# Patient Record
Sex: MALE | Race: WHITE | Employment: UNEMPLOYED | URBAN - METROPOLITAN AREA
[De-identification: names, ages, dates, MRNs, and addresses within clinical notes are randomized per-mention and may not be internally consistent; named-entity substitution may affect disease eponyms.]

---

## 2017-01-04 ENCOUNTER — TELEPHONE (OUTPATIENT)
Dept: GASTROENTEROLOGY | Facility: CLINIC | Age: 67
End: 2017-01-04

## 2017-01-04 DIAGNOSIS — Z12.11 ENCOUNTER FOR SCREENING COLONOSCOPY: Primary | ICD-10-CM

## 2017-01-12 ENCOUNTER — SURGERY (OUTPATIENT)
Age: 67
End: 2017-01-12

## 2017-01-12 RX ADMIN — FENTANYL CITRATE 50 MCG: 50 INJECTION, SOLUTION INTRAMUSCULAR; INTRAVENOUS at 10:56

## 2017-01-12 RX ADMIN — MIDAZOLAM HYDROCHLORIDE 1 MG: 1 INJECTION, SOLUTION INTRAMUSCULAR; INTRAVENOUS at 11:06

## 2017-01-12 RX ADMIN — FENTANYL CITRATE 50 MCG: 50 INJECTION, SOLUTION INTRAMUSCULAR; INTRAVENOUS at 11:04

## 2017-01-12 RX ADMIN — MIDAZOLAM HYDROCHLORIDE 1 MG: 1 INJECTION, SOLUTION INTRAMUSCULAR; INTRAVENOUS at 10:56

## 2017-01-12 RX ADMIN — Medication 2 ML: at 10:50

## 2017-01-18 ENCOUNTER — CARE COORDINATION (OUTPATIENT)
Dept: TRANSPLANT | Facility: CLINIC | Age: 67
End: 2017-01-18

## 2017-04-05 ENCOUNTER — PRE VISIT (OUTPATIENT)
Dept: UROLOGY | Facility: CLINIC | Age: 67
End: 2017-04-05

## 2017-04-13 ENCOUNTER — OFFICE VISIT (OUTPATIENT)
Dept: UROLOGY | Facility: CLINIC | Age: 67
End: 2017-04-13

## 2017-04-13 VITALS
BODY MASS INDEX: 23.1 KG/M2 | SYSTOLIC BLOOD PRESSURE: 143 MMHG | WEIGHT: 165 LBS | HEIGHT: 71 IN | HEART RATE: 82 BPM | DIASTOLIC BLOOD PRESSURE: 85 MMHG

## 2017-04-13 DIAGNOSIS — C67.8 MALIGNANT NEOPLASM OF OVERLAPPING SITES OF BLADDER (H): Primary | ICD-10-CM

## 2017-04-13 PROCEDURE — 88112 CYTOPATH CELL ENHANCE TECH: CPT | Performed by: UROLOGY

## 2017-04-13 PROCEDURE — 00000103 ZZHCL STATISTIC CYTO-FISH QC 88120: Performed by: UROLOGY

## 2017-04-13 ASSESSMENT — PAIN SCALES - GENERAL: PAINLEVEL: NO PAIN (0)

## 2017-04-13 NOTE — MR AVS SNAPSHOT
After Visit Summary   4/13/2017    Lupillo Murguia    MRN: [id number]           Patient Information     Date Of Birth          1950        Visit Information        Provider Department      4/13/2017 9:30 AM Ignacio Bustillos MD [facility] Urology and [facility]rolog[facility]        Today's Diagnoses     Malignant neoplasm of overlapping sites of bladder (H)    -  1      Care Instructions    Will send urine for cytology/FISH test.  If negative, follow up with Dr. Bustillos in one year with cystoscopy.    It was a pleasure meeting with you today.  Thank you for allowing me and my team the privilege of caring for you today.  YOU are the reason we are here, and I truly hope we provided you with the excellent service you deserve.  Please let us know if there is anything else we can do for you so that we can be sure you are leaving completely satisfied with your care experience.      Christi Jones KAMLA        Follow-ups after your visit        Your next 10 appointments already scheduled     Sep 26, 2017 10:00 AM CDT   (Arrive by 9:45 AM)   Return Liver Transplant with Le Simmons MD   [facility] Hepatology ([facility])    [address] [place] [id number] [phone]              Who to contact     Please call your clinic at [phone] to:    Ask questions about your health    Make or cancel appointments    Discuss your medicines    Learn about your test results    Speak to your doctor   If you have compliments or concerns about an experience at your clinic, or if you wish to file a complaint, please contact [facility] Patient Relations at [phone] or email us at [email or web address]         Additional Information About Your Visit        [employer] Information     [employer] is an electronic gateway that provides easy, online access to your medical records. With [employer] you can  "request a clinic appointment, read your test results, renew a prescription or communicate with your care team.     To sign up for [employer] visit the website at [email or web address]   You will be asked to enter the access code listed below, as well as some personal information. Please follow the directions to create your username and password.     Your access code is: [email or web address]  Expires: 2017  6:30 AM     Your access code will  in 90 days. If you need help or a new code, please contact your [facility] or call [phone] for assistance.        Care EveryWhere ID     This is your Care EveryWhere ID. This could be used by other organizations to access your [facility] medical records  [id number]        Your Vitals Were     Pulse Height BMI (Body Mass Index)             82 1.803 m (5' 11\") 23.01 kg/m2          Blood Pressure from Last 3 Encounters:   17 143/85   17 129/77   16 154/84    Weight from Last 3 Encounters:   17 74.8 kg (165 lb)   16 75 kg (165 lb 6.4 oz)   16 76.1 kg (167 lb 11.2 oz)              We Performed the Following     Cystoscopy ([address])     Cytology non gyn        Primary Care Provider Office Phone # Fax #    Joaquin Haines [phone]       [facility] [address]  [address] [facility] [address]        Thank you!     Thank you for choosing [facility] UROLOGY AND [facility] FOR PROSTATE AND UROLOGIC CANCERS  for your care. Our goal is always to provide you with excellent care. Hearing back from our patients is one way we can continue to improve our services. Please take a few minutes to complete the written survey that you may receive in the mail after your visit with us. Thank you!             Your Updated Medication List - Protect others around you: Learn how to safely use, store and throw away your medicines at www.disposemymeds.org.          This list is accurate as of: 17  9:50 AM.  " Always use your most recent med list.                   Brand Name Dispense Instructions for use    amLODIPine 5 MG tablet    NORVASC     Take 5 mg by mouth daily.       ASPIRIN PO      Take 325 mg by mouth daily       fluticasone 110 MCG/ACT Inhaler    FLOVENT HFA     Inhale 2 puffs into the lungs 2 times daily       metoprolol 25 MG tablet    LOPRESSOR     Take 25 mg by mouth 2 times daily. 1/2 tablet       OXYCODONE HCL PO      Take 5 mg by mouth every 6 hours as needed       tacrolimus 0.5 MG capsule    PROGRAF - GENERIC EQUIVALENT    180 capsule    Take 3 capsules (1.5 mg) by mouth every 12 hours       ursodiol 300 MG capsule    ACTIGALL    60 capsule    TAKE ONE CAPSULE BY MOUTH TWICE A DAY       VENTOLIN  (90 BASE) MCG/ACT Inhaler   Generic drug:  albuterol      Inhale 2 puffs into the lungs every 6 hours.

## 2017-04-13 NOTE — NURSING NOTE
Invasive Procedure Safety Checklist:    Procedure: Cystoscopy    Action: Complete sections and checkboxes as appropriate.    Pre-procedure:  1. Patient ID Verified with 2 identifiers (Selina and  or MRN) : YES    2. Procedure and site verified with patient/designee (when able) : YES    3. Accurate consent documentation in medical record : NO    4. H&P (or appropriate assessment) documented in medical record : NO  H&P must be up to 30 days prior to procedure an updated within 24 hours of                 Procedure as applicable.     5. Relevant diagnostic and radiology test results appropriately labeled and displayed as applicable : NO    6. Blood products, implants, devices, and/or special equipment available for the procedure as applicable : NO    7. Procedure site(s) marked with provider initials [Exclusions: ] : NO    8. Marking not required. Reason : Yes  Procedure does not require site marking    Time Out:     Time-Out performed immediately prior to starting procedure, including verbal and active participation of all team members addressing: YES    1. Correct patient identity.  2. Confirmed that the correct side and site are marked.  3. An accurate procedure to be done.  4. Agreement on the procedure to be done.  5. Correct patient position.  6. Relevant images and results are properly labeled and appropriately displayed.  7. The need to administer antibiotics or fluids for irrigation purposes during the procedure as applicable.  8. Safety precautions based on patient history or medication use.    During Procedure: Verification of correct person, site, and procedure occurs any time the responsibility for care of the patient is transferred to another member of the care team.    KAREL Saha

## 2017-04-13 NOTE — LETTER
May 3, 2017       TO: Alexander Delgado  [address] [place] [address]       DearMr.Shantal,    We are writing to inform you of your test results.    Your test results fall within the expected range(s) or remain unchanged from previous results.  Please continue with current treatment plan.    Resulted Orders   Cytology non gyn   Result Value Ref Range    Copath Report       Patient Name: ALEXANDER DELGADO  MR#: [id number]  Specimen #: [id number]  Collected: 4/13/2017  Received: 4/13/2017  Reported: 4/19/2017 15:39  Ordering Phy(s): CHRISTIE HARDY    For improved result formatting, select 'View Enhanced Report Format'  under Linked Documents section.    SPECIMEN/STAIN PROCESS:  A: Urine-voided       Pap-Cyto x 1  B: FISH UroVysion, Voided Urine       FISH-CEP 3 x 1, FISH-CEP 7 x 1, FISH-CEP 17 x 1, FISH-LCI 9P21 x 1,  UroVysion x 1    ----------------------------------------------------------------    CYTOLOGIC INTERPRETATION:    A.  Urine-voided:   Negative for High-Grade Urothelial Carcinoma  Specimen Adequacy: Satisfactory for evaluation but limited by:  -scant cellularity.    B.  FISH UroVysion, Voided Urine:   Unsatisfactory specimen for  UroVysion testing  Specimen Adequacy: Unsatisfactory for evaluation.  Insufficient  cellularity for UroVysion FISH analysis.    I have personally reviewed all specimens and/or slides, including the  listed speci al stains, and used them with my medical judgment to  determine the final diagnosis.    Electronically signed out by:  Khurram Velazquez M.D., Physicians    Processed and screened at [facility],  [facility]    CLINICAL HISTORY:  The patient is a 66-year-old man with history of urothelial carcinoma  in-situ and non-invasive high-grade urothelial carcinoma of bladder.  A  voided urine specimen is sent for cytologic examination and for FISH  UroVysion assay.    ,    GROSS:  A. Urine-voided:  Received 15 ml of yellow,  clear fluid, processed as 1  Pap stained [employer] and 1 slide made for Urovysion FISH.    B. FISH UroVysion, Voided Urine:  Split with part A.    Less than 25 non-inflammatory cells are identified.  The specimen  submitted is insufficient for UroVysion FISH probe set analysis.    METHODS:    It is recommended that the test be repeated if clinically indicated.  Billing will be credited for UroVysion FISH Test.    MICROSCOPIC:  Mi croscopic examination is performed.    Nan Chavez MD, Ph D          Khurram Velazquez III, MD    CPT Codes:  A: 72138-GIFGKGD  B: 46067-JNDCOHI    TESTING LAB LOCATION:  [facility], [address]   [address] [phone]    COLLECTION SITE:  Client:  [facility]  Location:  [facility] ()         Thank you for choosing [facility] for your care. Please follow up as previously planned.  Please call with any questions or concerns.    Thank you,  Kiersten Patel RN Care Coordinator for  Dr. Ignacio Bustillos

## 2017-04-13 NOTE — NURSING NOTE
"Chief Complaint   Patient presents with     RECHECK     Bladder cancer follow up with cystoscopy       Initial Ht 1.803 m (5' 11\")  Wt 74.8 kg (165 lb)  BMI 23.01 kg/m2 Estimated body mass index is 23.01 kg/(m^2) as calculated from the following:    Height as of this encounter: 1.803 m (5' 11\").    Weight as of this encounter: 74.8 kg (165 lb).  Medication Reconciliation: complete     KAREL Saha    "

## 2017-04-13 NOTE — LETTER
4/13/2017       RE: Lupillo Murguia  [address]  [place] [address]     Dear Colleague,    Thank you for referring your patient, Lupillo Murguia, to the [facility] UROLOGY AND [facility] FOR PROSTATE AND UROLOGIC CANCERS [facility]. Please see a copy of my visit note below.    Bladder cancer follow up    Date of Initial Diagnosis: June 2013  Stage of Initial Diagnosis: CIS  Grade at Initial Diagnosis: High grade  Site of First Diagnosis: Posterior wall  Any Recurrence?: Yes 2013  Intravesical Treatments: Mitomycin C in August 2013, Valrubicin in September 2013, BCG X6 2013, maintenance BCG 1/2 strength X3 2014.  Date of Last Cysto: 4/2016  Date of Last Upper Tract Imaging: July 2013    Tolerated BCG well, 1/2 dose was tolerable    CYSTOSCOPY PROCEDURE:  After sterile preparation and draping of the patient,  a 16-Slovenian flexible cystoscope was introduced via the urethra.  It was passed without difficulty into the bladder.  The urethra was open without evidence of stricture.  The ureteral orifices were orthotopic.  The bladder mucosa was evaluated using both antegrade and retroflex views and this showed no evidence of trabeculation.  There were no stones.   There is a whitish nodule of the prostate on the left side near the bladder neck (probable calcification).  Small reddened area on the posterior bladder, left of midline    Cytology, FISH pending    A/P Hx of CIS and high grade non-invasive bladder cancer with CORRINA after 1st round of BCG and now maintenance BCG.  Now ~48 months since dx,     Follow up in 12 months with cysto if cytology/FISH negative,       Ignacio Bustillos MD  Department of Urology Staff  [facility]

## 2017-04-13 NOTE — PROGRESS NOTES
Bladder cancer follow up    Date of Initial Diagnosis: June 2013  Stage of Initial Diagnosis: CIS  Grade at Initial Diagnosis: High grade  Site of First Diagnosis: Posterior wall  Any Recurrence?: Yes 2013  Intravesical Treatments: Mitomycin C in August 2013, Valrubicin in September 2013, BCG X6 2013, maintenance BCG 1/2 strength X3 2014.  Date of Last Cysto: 4/2016  Date of Last Upper Tract Imaging: July 2013    Tolerated BCG well, 1/2 dose was tolerable    CYSTOSCOPY PROCEDURE:  After sterile preparation and draping of the patient,  a 16-Rwandan flexible cystoscope was introduced via the urethra.  It was passed without difficulty into the bladder.  The urethra was open without evidence of stricture.  The ureteral orifices were orthotopic.  The bladder mucosa was evaluated using both antegrade and retroflex views and this showed no evidence of trabeculation.  There were no stones.   There is a whitish nodule of the prostate on the left side near the bladder neck (probable calcification).  Small reddened area on the posterior bladder, left of midline    Cytology, FISH pending    A/P Hx of CIS and high grade non-invasive bladder cancer with CORRINA after 1st round of BCG and now maintenance BCG.  Now ~48 months since dx,     Follow up in 12 months with cysto if cytology/FISH negative,       Ignacio Bustillos MD  Department of Urology Staff  [facility]

## 2017-04-13 NOTE — PATIENT INSTRUCTIONS
Will send urine for cytology/FISH test.  If negative, follow up with Dr. Bustillos in one year with cystoscopy.    It was a pleasure meeting with you today.  Thank you for allowing me and my team the privilege of caring for you today.  YOU are the reason we are here, and I truly hope we provided you with the excellent service you deserve.  Please let us know if there is anything else we can do for you so that we can be sure you are leaving completely satisfied with your care experience.      KAREL Saha

## 2017-04-19 LAB — COPATH REPORT: NORMAL

## 2017-05-28 ENCOUNTER — HOSPITAL ENCOUNTER (OUTPATIENT)
Dept: LAB | Age: 67
Setting detail: SPECIMEN
Discharge: HOME | End: 2017-05-28

## 2017-07-13 DIAGNOSIS — Z94.4 LIVER REPLACED BY TRANSPLANT (H): ICD-10-CM

## 2017-07-13 DIAGNOSIS — Z13.220 LIPID SCREENING: ICD-10-CM

## 2017-07-13 LAB
ALBUMIN SERPL-MCNC: 3.9 G/DL (ref 3.4–5)
ALP SERPL-CCNC: 63 U/L (ref 40–150)
ALT SERPL W P-5'-P-CCNC: 19 U/L (ref 0–70)
ANION GAP SERPL CALCULATED.3IONS-SCNC: 6 MMOL/L (ref 3–14)
AST SERPL W P-5'-P-CCNC: 13 U/L (ref 0–45)
BILIRUB DIRECT SERPL-MCNC: 0.1 MG/DL (ref 0–0.2)
BILIRUB SERPL-MCNC: 0.5 MG/DL (ref 0.2–1.3)
BUN SERPL-MCNC: 16 MG/DL (ref 7–30)
CALCIUM SERPL-MCNC: 8.6 MG/DL (ref 8.5–10.1)
CHLORIDE SERPL-SCNC: 104 MMOL/L (ref 94–109)
CHOLEST SERPL-MCNC: 151 MG/DL
CO2 SERPL-SCNC: 25 MMOL/L (ref 20–32)
CREAT SERPL-MCNC: 1.13 MG/DL (ref 0.66–1.25)
CREAT UR-MCNC: 21 MG/DL
ERYTHROCYTE [DISTWIDTH] IN BLOOD BY AUTOMATED COUNT: 16 % (ref 10–15)
GFR SERPL CREATININE-BSD FRML MDRD: 65 ML/MIN/1.7M2
GLUCOSE SERPL-MCNC: 80 MG/DL (ref 70–99)
HCT VFR BLD AUTO: 41.1 % (ref 40–53)
HDLC SERPL-MCNC: 45 MG/DL
HGB BLD-MCNC: 12.4 G/DL (ref 13.3–17.7)
LDLC SERPL CALC-MCNC: 88 MG/DL
MCH RBC QN AUTO: 25.6 PG (ref 26.5–33)
MCHC RBC AUTO-ENTMCNC: 30.2 G/DL (ref 31.5–36.5)
MCV RBC AUTO: 85 FL (ref 78–100)
NONHDLC SERPL-MCNC: 106 MG/DL
PLATELET # BLD AUTO: 237 10E9/L (ref 150–450)
POTASSIUM SERPL-SCNC: 4.2 MMOL/L (ref 3.4–5.3)
PROT SERPL-MCNC: 7.6 G/DL (ref 6.8–8.8)
PROT UR-MCNC: <0.05 G/L
PROT/CREAT 24H UR: NORMAL G/G CR (ref 0–0.2)
RBC # BLD AUTO: 4.84 10E12/L (ref 4.4–5.9)
SODIUM SERPL-SCNC: 135 MMOL/L (ref 133–144)
TACROLIMUS BLD-MCNC: 4.7 UG/L (ref 5–15)
TME LAST DOSE: ABNORMAL H
TRIGL SERPL-MCNC: 90 MG/DL
WBC # BLD AUTO: 8.2 10E9/L (ref 4–11)

## 2017-07-13 PROCEDURE — 80197 ASSAY OF TACROLIMUS: CPT | Performed by: INTERNAL MEDICINE

## 2017-08-04 ENCOUNTER — OFFICE VISIT (OUTPATIENT)
Dept: DERMATOLOGY | Facility: CLINIC | Age: 67
End: 2017-08-04

## 2017-08-04 DIAGNOSIS — Z94.4 LIVER REPLACED BY TRANSPLANT (H): ICD-10-CM

## 2017-08-04 DIAGNOSIS — D48.5 NEOPLASM OF UNCERTAIN BEHAVIOR OF SKIN: Primary | ICD-10-CM

## 2017-08-04 PROCEDURE — 88305 TISSUE EXAM BY PATHOLOGIST: CPT | Performed by: DERMATOLOGY

## 2017-08-04 ASSESSMENT — PAIN SCALES - GENERAL
PAINLEVEL: NO PAIN (0)
PAINLEVEL: NO PAIN (0)

## 2017-08-04 NOTE — PATIENT INSTRUCTIONS

## 2017-08-04 NOTE — PROGRESS NOTES
[facility] Dermatology Note      Dermatology Problem List:  1. Multiple Basal Cell Carcinoma  2. Seborrheic Keratosis  3. Actinic keratosis  4. Liver transplant pt    Encounter Date: Aug 4, 2017    CC:  Chief Complaint   Patient presents with     Skin Check     Lupillo is here today for a skin check, he does not have any concerns.         History of Present Illness:  Mr. Lupillo Murguia is a 67 year old male who presents as a follow-up for yearly skin check. The patient was last seen 8/16/16 when he had a yearly skin exam that showed no evidence of new concerning lesions. He has a hx of liver transplantation in 2010 and is currently on tacrolimus 1.5mg BID. Since being immunosupressed he has gotten several basal cell carcinomas removed on hi scalp, nasal ala, upper back and right temple. The last of which was in 2014. Today he has no particular concern other than a scalp lesion on the top of his head that he mentioned has been there for 4-6 years. It does not hurt him but doesn't go away with his shampoo he uses for flaking skin on his scalp. Is not painful but has bled in the past when he has hit it with a comb. It has not been mentioned previously.     Past Medical History:   Patient Active Problem List   Diagnosis     Liver replaced by transplant (H)     BCC (basal cell carcinoma)     Bladder cancer (H)     Skin exam, screening for cancer     Past Medical History:   Diagnosis Date     BCC (basal cell carcinoma)      Liver transplanted (H)      Unspecified cerebral artery occlusion with cerebral infarction October 2013     Past Surgical History:   Procedure Laterality Date     COLONOSCOPY N/A 1/12/2017    Procedure: COMBINED COLONOSCOPY, SINGLE OR MULTIPLE BIOPSY/POLYPECTOMY BY BIOPSY;  Surgeon: Le Simmons MD;  Location:  GI     MOHS MICROGRAPHIC PROCEDURE  3/7/13    right nasal ala, left upper back, right temple     TRANSPLANT         Medications:  Current Outpatient  Prescriptions   Medication Sig Dispense Refill     ursodiol (ACTIGALL) 300 MG capsule TAKE ONE CAPSULE BY MOUTH TWICE A DAY 60 capsule 11     fluticasone (FLOVENT HFA) 110 MCG/ACT inhaler Inhale 2 puffs into the lungs 2 times daily       tacrolimus (PROGRAF - GENERIC EQUIVALENT) 0.5 MG capsule Take 3 capsules (1.5 mg) by mouth every 12 hours 180 capsule 11     ASPIRIN PO Take 325 mg by mouth daily       amLODIPine (NORVASC) 5 MG tablet Take 5 mg by mouth daily.       metoprolol (LOPRESSOR) 25 MG tablet Take 25 mg by mouth 2 times daily. 1/2 tablet        Albuterol Sulfate (VENTOLIN HFA) 108 (90 BASE) MCG/ACT AERS Inhale 2 puffs into the lungs every 6 hours.       OXYCODONE HCL PO Take 5 mg by mouth every 6 hours as needed       Allergies   Allergen Reactions     Levaquin [Levofloxacin]          Review of Systems:  -Skin: As above in HPI. No additional skin concerns.    Physical exam:  Vitals: There were no vitals taken for this visit.  GEN: This is a well developed, well-nourished male in no acute distress, in a pleasant mood.    SKIN: Total skin excluding the undergarment areas was performed. The exam included the head/face, neck, both arms, chest, back, abdomen, both legs, digits and/or nails.   -1 cm mobile nodule on the L lower cheek with impression of central punctum. Mid back with nodule 2 cm with central open comedone   -Diffuse verrucous pigmented papules and plaques on back, chest, abdomen, face and arms  -Rough feeling light pink papule on forehead  -2 cm x 2 cm raised erythematous plaque with crusting,veins and glassy appearance on vertex of head  -Raised erythematous glassy appearing 2 mm papule on forehead and 8 mm on  right mandible  -No other lesions of concern on areas examined.      Impression/Plan:  1. Neoplasm of uncertain behavior:  on scalp, right cheek and forhead,    Shave biopsy(performed by faculty): After discussion of benefits and risks including but not limited to bleeding, infection,  scar, incomplete removal, recurrence, and non-diagnostic biopsy, written consent and photographs were obtained. Time-out was performed. The area was cleaned with isopropyl alcohol. 3mL of 1% lidocaine was injected to obtain adequate anesthesia of the lesion on the scalp, right cheek and forhead. A shave biopsy was performed. Hemostasis was achieved with aluminium chloride. Vaseline and a sterile dressing were applied. The patient tolerated the procedure and no complications were noted. The patient was provided with verbal and written post care instructions.      Lesion's presentation consistent with Basal Cell Carcinoma. Patient has had several removed in the past. Mass on top of scalp is 2 cm x 2 cm and will likely need MOHS procedure pending biopsy results    2. Seborrheic keratosis, non irritated    Benign nature was discussed.No further intervention required at this time.     Discussed the option of removing some of them in the future if they started to bother him    3. Actinic keratosis on forehead     Cryotherapy procedure note (performed by faculty): After verbal consent and discussion of risks and benefits including but no limited to dyspigmentation/scar, blister, infection, recurrence, was treated with 1-2mm freeze border for 2 cycles with liquid nitrogen. Post cryotherapy instructions were provided.     Continue to follow    4. Mobile 1 cm nodule on left lower cheek     likely consistent with benign ei cyst but will continue to follow      Follow-up in 6 months, earlier for new or changing lesions.     Staff Involved:  Scribed by Andres Blackwood, MS4 for Dr. Brooklyn Blackwood acted as a scribe for me today and accurately reflected my words and actions in the  History, Review of Systems, Physical exam and Plan.  I have reviewed the content of the documentation and have edited it as needed. I have personally performed the services documented here and the documentation accurately represents those  services and the decisions I have made. I performed the procedure.     Josefina Barahona MD  Dermatology Staff

## 2017-08-04 NOTE — LETTER
8/4/2017       RE: Lupillo Murguia  [address] [place] [address]     Dear Colleague,    Thank you for referring your patient, Lupillo Murguia, to the [facility] DERMATOLOGY at [facility]. Please see a copy of my visit note below.    [facility] Dermatology Note      Dermatology Problem List:  1. Multiple Basal Cell Carcinoma  2. Seborrheic Keratosis  3. Actinic keratosis  4. Liver transplant pt    Encounter Date: Aug 4, 2017    CC:  Chief Complaint   Patient presents with     Skin Check     Lupillo is here today for a skin check, he does not have any concerns.         History of Present Illness:  Mr. Lupillo Murguia is a 67 year old male who presents as a follow-up for yearly skin check. The patient was last seen 8/16/16 when he had a yearly skin exam that showed no evidence of new concerning lesions. He has a hx of liver transplantation in 2010 and is currently on tacrolimus 1.5mg BID. Since being immunosupressed he has gotten several basal cell carcinomas removed on hi scalp, nasal ala, upper back and right temple. The last of which was in 2014. Today he has no particular concern other than a scalp lesion on the top of his head that he mentioned has been there for 4-6 years. It does not hurt him but doesn't go away with his shampoo he uses for flaking skin on his scalp. Is not painful but has bled in the past when he has hit it with a comb. It has not been mentioned previously.     Past Medical History:   Patient Active Problem List   Diagnosis     Liver replaced by transplant (H)     BCC (basal cell carcinoma)     Bladder cancer (H)     Skin exam, screening for cancer     Past Medical History:   Diagnosis Date     BCC (basal cell carcinoma)      Liver transplanted (H)      Unspecified cerebral artery occlusion with cerebral infarction October 2013     Past Surgical History:   Procedure Laterality Date     COLONOSCOPY N/A 1/12/2017    Procedure: COMBINED  COLONOSCOPY, SINGLE OR MULTIPLE BIOPSY/POLYPECTOMY BY BIOPSY;  Surgeon: Le Simmons MD;  Location:  GI     MOHS MICROGRAPHIC PROCEDURE  3/7/13    right nasal ala, left upper back, right temple     TRANSPLANT         Medications:  Current Outpatient Prescriptions   Medication Sig Dispense Refill     ursodiol (ACTIGALL) 300 MG capsule TAKE ONE CAPSULE BY MOUTH TWICE A DAY 60 capsule 11     fluticasone (FLOVENT HFA) 110 MCG/ACT inhaler Inhale 2 puffs into the lungs 2 times daily       tacrolimus (PROGRAF - GENERIC EQUIVALENT) 0.5 MG capsule Take 3 capsules (1.5 mg) by mouth every 12 hours 180 capsule 11     ASPIRIN PO Take 325 mg by mouth daily       amLODIPine (NORVASC) 5 MG tablet Take 5 mg by mouth daily.       metoprolol (LOPRESSOR) 25 MG tablet Take 25 mg by mouth 2 times daily. 1/2 tablet        Albuterol Sulfate (VENTOLIN HFA) 108 (90 BASE) MCG/ACT AERS Inhale 2 puffs into the lungs every 6 hours.       OXYCODONE HCL PO Take 5 mg by mouth every 6 hours as needed       Allergies   Allergen Reactions     Levaquin [Levofloxacin]          Review of Systems:  -Skin: As above in HPI. No additional skin concerns.    Physical exam:  Vitals: There were no vitals taken for this visit.  GEN: This is a well developed, well-nourished male in no acute distress, in a pleasant mood.    SKIN: Total skin excluding the undergarment areas was performed. The exam included the head/face, neck, both arms, chest, back, abdomen, both legs, digits and/or nails.   -1 cm mobile nodule on the L lower cheek with impression of central punctum. Mid back with nodule 2 cm with central open comedone   -Diffuse verrucous pigmented papules and plaques on back, chest, abdomen, face and arms  -Rough feeling light pink papule on forehead  -2 cm x 2 cm raised erythematous plaque with crusting,veins and glassy appearance on vertex of head  -Raised erythematous glassy appearing 2 mm papule on forehead and 8 mm on  right mandible  -No  other lesions of concern on areas examined.      Impression/Plan:  1. Neoplasm of uncertain behavior:  on scalp, right cheek and forhead,    Shave biopsy(performed by faculty): After discussion of benefits and risks including but not limited to bleeding, infection, scar, incomplete removal, recurrence, and non-diagnostic biopsy, written consent and photographs were obtained. Time-out was performed. The area was cleaned with isopropyl alcohol. 3mL of 1% lidocaine was injected to obtain adequate anesthesia of the lesion on the scalp, right cheek and forhead. A shave biopsy was performed. Hemostasis was achieved with aluminium chloride. Vaseline and a sterile dressing were applied. The patient tolerated the procedure and no complications were noted. The patient was provided with verbal and written post care instructions.      Lesion's presentation consistent with Basal Cell Carcinoma. Patient has had several removed in the past. Mass on top of scalp is 2 cm x 2 cm and will likely need MOHS procedure pending biopsy results    2. Seborrheic keratosis, non irritated    Benign nature was discussed.No further intervention required at this time.     Discussed the option of removing some of them in the future if they started to bother him    3. Actinic keratosis on forehead     Cryotherapy procedure note (performed by faculty): After verbal consent and discussion of risks and benefits including but no limited to dyspigmentation/scar, blister, infection, recurrence, was treated with 1-2mm freeze border for 2 cycles with liquid nitrogen. Post cryotherapy instructions were provided.     Continue to follow    4. Mobile 1 cm nodule on left lower cheek     likely consistent with benign ei cyst but will continue to follow      Follow-up in 6 months, earlier for new or changing lesions.     Staff Involved:  Scribed by Andres Blackwood, MS4 for Dr. Brooklyn Blackwood acted as a scribe for me today and accurately reflected my  words and actions in the  History, Review of Systems, Physical exam and Plan.  I have reviewed the content of the documentation and have edited it as needed. I have personally performed the services documented here and the documentation accurately represents those services and the decisions I have made. I performed the procedure.     Josefina Barahona MD  Dermatology Staff

## 2017-08-04 NOTE — MR AVS SNAPSHOT
After Visit Summary   8/4/2017    Lupillo Murguia    MRN: [id number]           Patient Information     Date Of Birth          1950        Visit Information        Provider Department      8/4/2017 10:00 AM Josefina Barahona MD [facility] Dermatology        Care Instructions    Wound Care After a Biopsy    What is a skin biopsy?  A skin biopsy allows the doctor to examine a very small piece of tissue under the microscope to determine the diagnosis and the best treatment for the skin condition. A local anesthetic (numbing medicine)  is injected with a very small needle into the skin area to be tested. A small piece of skin is taken from the area. Sometimes a suture (stitch) is used.     What are the risks of a skin biopsy?  I will experience scar, bleeding, swelling, pain, crusting and redness. I may experience incomplete removal or recurrence. Risks of this procedure are excessive bleeding, bruising, infection, nerve damage, numbness, thick (hypertrophic or keloidal) scar and non-diagnostic biopsy.    How should I care for my wound for the first 24 hours?    Keep the wound dry and covered for 24 hours    If it bleeds, hold direct pressure on the area for 15 minutes. If bleeding does not stop then go to the emergency room    Avoid strenuous exercise the first 1-2 days or as your doctor instructs you    How should I care for the wound after 24 hours?    After 24 hours, remove the bandage    You may bathe or shower as normal    If you had a scalp biopsy, you can shampoo as usual and can use shower water to clean the biopsy site daily    Clean the wound twice a day with gentle soap and water    Do not scrub, be gentle    Apply white petroleum/Vaseline after cleaning the wound with a cotton swab or a clean finger, and keep the site covered with a Bandaid /bandage. Bandages are not necessary with a scalp biopsy    If you are unable to cover the site with a Bandaid /bandage, re-apply ointment 2-3 times a  day to keep the site moist. Moisture will help with healing    Avoid strenuous activity for first 1-2 days    Avoid lakes, rivers, pools, and oceans until the stitches are removed or the site is healed    How do I clean my wound?    Wash hands thoroughly with soap or use hand  before all wound care    Clean the wound with gentle soap and water    Apply white petroleum/Vaseline  to wound after it is clean    Replace the Bandaid /bandage to keep the wound covered for the first few days or as instructed by your doctor    If you had a scalp biopsy, warm shower water to the area on a daily basis should suffice    What should I use to clean my wound?     Cotton-tipped applicators (Qtips )    White petroleum jelly (Vaseline ). Use a clean new container and use Q-tips to apply.    Bandaids   as needed    Gentle soap     How should I care for my wound long term?    Do not get your wound dirty    Keep up with wound care for one week or until the area is healed.    A small scab will form and fall off by itself when the area is completely healed. The area will be red and will become pink in color as it heals. Sun protection is very important for how your scar will turn out. Sunscreen with an SPF 30 or greater is recommended once the area is healed.    If you have stitches, stitches need to be removed in  days. You may return to our clinic for this or you may have it done locally at your doctor s office.    You should have some soreness but it should be mild and slowly go away over several days. Talk to your doctor about using tylenol for pain,    When should I call my doctor?  If you have increased:     Pain or swelling    Pus or drainage (clear or slightly yellow drainage is ok)    Temperature over 100F    Spreading redness or warmth around wound    When will I hear about my results?  The biopsy results can take 2-3 weeks to come back. The clinic will call you with the results, send you a [employer] message, or have you  schedule a follow-up clinic or phone time to discuss the results. Contact our clinics if you do not hear from us in 3 weeks.     Who should I call with questions?    [facility]: [phone]     [facility]: [phone]    For urgent needs outside of business hours call the [facility] at [phone] and ask for the dermatology resident on call              Follow-ups after your visit        Your next 10 appointments already scheduled     Sep 26, 2017 10:00 AM CDT   (Arrive by 9:45 AM)   Return Liver Transplant with Le Simmons MD   [facility] Hepatology ([facility] [place])    [address] [facility] [address] [phone]              Who to contact     Please call your clinic at [phone] to:    Ask questions about your health    Make or cancel appointments    Discuss your medicines    Learn about your test results    Speak to your doctor   If you have compliments or concerns about an experience at your clinic, or if you wish to file a complaint, please contact [facility] Patient Relations at [phone] or email us at [email or web address]         Additional Information About Your Visit        [employer] Information     "[employer]t is an electronic gateway that provides easy, online access to your medical records. With [employer], you can request a clinic appointment, read your test results, renew a prescription or communicate with your care team.     To sign up for "[employer]t visit the website at [email or web address]   You will be asked to enter the access code listed below, as well as some personal information. Please follow the directions to create your username and password.     Your access code is: [email or web address]  Expires: 10/11/2017  6:31 AM     Your access code will  in 90 days. If you need help or a new code, please contact your  [facility] or call [phone] for assistance.        Care EveryWhere ID     This is your Care EveryWhere ID. This could be used by other organizations to access your [place] medical records  [id number]         Blood Pressure from Last 3 Encounters:   04/13/17 143/85   01/12/17 129/77   09/27/16 154/84    Weight from Last 3 Encounters:   04/13/17 74.8 kg (165 lb)   09/27/16 75 kg (165 lb 6.4 oz)   04/18/16 76.1 kg (167 lb 11.2 oz)              Today, you had the following     No orders found for display       Primary Care Provider Office Phone # Fax #    Joaquin Haines [phone]       [facility] [address]  [address] [facility] [address]        Equal Access to Services     MADDIE HERNANDEZ : Hadii aad ku hadasho Soomaali, waaxda luqadaha, qaybta kaalmada adeegyada, waxay gurvinderin hayaamanuel hunter . So [facility] [phone].    ATENCIÓN: Si habla español, tiene a saleh disposición servicios gratuitos de asistencia lingüística. Catracho al [phone].    We comply with applicable federal civil rights laws and [place] laws. We do not discriminate on the basis of race, color, national origin, age, disability sex, sexual orientation or gender identity.            Thank you!     Thank you for choosing [facility] DERMATOLOGY  for your care. Our goal is always to provide you with excellent care. Hearing back from our patients is one way we can continue to improve our services. Please take a few minutes to complete the written survey that you may receive in the mail after your visit with us. Thank you!             Your Updated Medication List - Protect others around you: Learn how to safely use, store and throw away your medicines at www.disposemymeds.org.          This list is accurate as of: 8/4/17 11:07 AM.  Always use your most recent med list.                   Brand Name Dispense Instructions for use Diagnosis    amLODIPine 5 MG tablet    NORVASC     Take  5 mg by mouth daily.        ASPIRIN PO      Take 325 mg by mouth daily        fluticasone 110 MCG/ACT Inhaler    FLOVENT HFA     Inhale 2 puffs into the lungs 2 times daily    Liver replaced by transplant (H)       metoprolol 25 MG tablet    LOPRESSOR     Take 25 mg by mouth 2 times daily. 1/2 tablet        OXYCODONE HCL PO      Take 5 mg by mouth every 6 hours as needed    Liver replaced by transplant (H)       tacrolimus 0.5 MG capsule    PROGRAF - GENERIC EQUIVALENT    180 capsule    Take 3 capsules (1.5 mg) by mouth every 12 hours    Liver replaced by transplant (H)       ursodiol 300 MG capsule    ACTIGALL    60 capsule    TAKE ONE CAPSULE BY MOUTH TWICE A DAY    Liver replaced by transplant (H)       VENTOLIN  (90 BASE) MCG/ACT Inhaler   Generic drug:  albuterol      Inhale 2 puffs into the lungs every 6 hours.

## 2017-08-04 NOTE — NURSING NOTE
Dermatology Rooming Note    Lupillo Murguia's goals for this visit include:   Chief Complaint   Patient presents with     Skin Check     Lupillo is here today for a skin check, he does not have any concerns.     Kiersten Pa MA student

## 2017-08-06 RX ORDER — LIDOCAINE HYDROCHLORIDE AND EPINEPHRINE 10; 10 MG/ML; UG/ML
3 INJECTION, SOLUTION INFILTRATION; PERINEURAL ONCE
Qty: 3 ML | Refills: 0 | OUTPATIENT
Start: 2017-08-06 | End: 2017-08-06

## 2017-08-08 LAB — COPATH REPORT: NORMAL

## 2017-08-11 DIAGNOSIS — C44.310 BCC (BASAL CELL CARCINOMA), FACE: Primary | ICD-10-CM

## 2017-08-15 DIAGNOSIS — Z94.4 LIVER REPLACED BY TRANSPLANT (H): ICD-10-CM

## 2017-08-15 NOTE — TELEPHONE ENCOUNTER
Drug Name: tacrolimus 0.5mg  Last Fill Date: 7/13/17  Quantity: 180    Abi Cobb   [facility]  [phone]

## 2017-08-16 RX ORDER — TACROLIMUS 0.5 MG/1
1.5 CAPSULE ORAL EVERY 12 HOURS
Qty: 180 CAPSULE | Refills: 11 | Status: SHIPPED | OUTPATIENT
Start: 2017-08-16 | End: 2018-08-07

## 2017-09-13 ENCOUNTER — TELEPHONE (OUTPATIENT)
Dept: DERMATOLOGY | Facility: CLINIC | Age: 67
End: 2017-09-13

## 2017-09-13 NOTE — TELEPHONE ENCOUNTER
"Called patient to schedule for Mohs. He was very upset that we wanted to schedule him for two appointments for two sites. He was also upset that he didn't hear from us sooner. I apologized and stated that our surgeon's schedules had been in flux with recent staff changes. He stated he didn't believe we \"have any qualified dermatologists here since Dr. 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 He will call back for a referral.   "

## 2017-09-20 DIAGNOSIS — Z94.4 LIVER REPLACED BY TRANSPLANT (H): ICD-10-CM

## 2017-09-20 LAB
ALBUMIN SERPL-MCNC: 3.8 G/DL (ref 3.4–5)
ALP SERPL-CCNC: 62 U/L (ref 40–150)
ALT SERPL W P-5'-P-CCNC: 23 U/L (ref 0–70)
ANION GAP SERPL CALCULATED.3IONS-SCNC: 9 MMOL/L (ref 3–14)
AST SERPL W P-5'-P-CCNC: 18 U/L (ref 0–45)
BILIRUB DIRECT SERPL-MCNC: 0.1 MG/DL (ref 0–0.2)
BILIRUB SERPL-MCNC: 0.6 MG/DL (ref 0.2–1.3)
BUN SERPL-MCNC: 18 MG/DL (ref 7–30)
CALCIUM SERPL-MCNC: 8.6 MG/DL (ref 8.5–10.1)
CHLORIDE SERPL-SCNC: 104 MMOL/L (ref 94–109)
CO2 SERPL-SCNC: 24 MMOL/L (ref 20–32)
CREAT SERPL-MCNC: 1.05 MG/DL (ref 0.66–1.25)
ERYTHROCYTE [DISTWIDTH] IN BLOOD BY AUTOMATED COUNT: 15.9 % (ref 10–15)
GFR SERPL CREATININE-BSD FRML MDRD: 70 ML/MIN/1.7M2
GLUCOSE SERPL-MCNC: 92 MG/DL (ref 70–99)
HCT VFR BLD AUTO: 42.9 % (ref 40–53)
HGB BLD-MCNC: 13.4 G/DL (ref 13.3–17.7)
MCH RBC QN AUTO: 26.6 PG (ref 26.5–33)
MCHC RBC AUTO-ENTMCNC: 31.2 G/DL (ref 31.5–36.5)
MCV RBC AUTO: 85 FL (ref 78–100)
PLATELET # BLD AUTO: 194 10E9/L (ref 150–450)
POTASSIUM SERPL-SCNC: 4 MMOL/L (ref 3.4–5.3)
PROT SERPL-MCNC: 7.7 G/DL (ref 6.8–8.8)
RBC # BLD AUTO: 5.03 10E12/L (ref 4.4–5.9)
SODIUM SERPL-SCNC: 138 MMOL/L (ref 133–144)
TACROLIMUS BLD-MCNC: 5.6 UG/L (ref 5–15)
TME LAST DOSE: NORMAL H
WBC # BLD AUTO: 7.1 10E9/L (ref 4–11)

## 2017-09-20 PROCEDURE — 80197 ASSAY OF TACROLIMUS: CPT | Performed by: INTERNAL MEDICINE

## 2017-09-25 DIAGNOSIS — Z94.4 LIVER REPLACED BY TRANSPLANT (H): Primary | ICD-10-CM

## 2017-09-26 ENCOUNTER — OFFICE VISIT (OUTPATIENT)
Dept: GASTROENTEROLOGY | Facility: CLINIC | Age: 67
End: 2017-09-26
Attending: INTERNAL MEDICINE
Payer: MEDICARE

## 2017-09-26 VITALS
RESPIRATION RATE: 16 BRPM | OXYGEN SATURATION: 95 % | BODY MASS INDEX: 24.44 KG/M2 | SYSTOLIC BLOOD PRESSURE: 160 MMHG | DIASTOLIC BLOOD PRESSURE: 87 MMHG | WEIGHT: 174.6 LBS | HEART RATE: 70 BPM | HEIGHT: 71 IN | TEMPERATURE: 97.7 F

## 2017-09-26 DIAGNOSIS — Z94.4 LIVER REPLACED BY TRANSPLANT (H): Primary | ICD-10-CM

## 2017-09-26 DIAGNOSIS — C44.91 BCC (BASAL CELL CARCINOMA): ICD-10-CM

## 2017-09-26 PROCEDURE — 99212 OFFICE O/P EST SF 10 MIN: CPT | Mod: ZF

## 2017-09-26 ASSESSMENT — PAIN SCALES - GENERAL: PAINLEVEL: NO PAIN (0)

## 2017-09-26 NOTE — MR AVS SNAPSHOT
After Visit Summary   9/26/2017    Lupillo Murguia    MRN: [id number]           Patient Information     Date Of Birth          1950        Visit Information        Provider Department      9/26/2017 10:00 AM Le Simmons MD [facility] Hepatology        Today's Diagnoses     Liver replaced by transplant (H)    -  1    BCC (basal cell carcinoma)           Follow-ups after your visit        Additional Services     DERMATOLOGY REFERRAL       L transplant patient. Hx of BCC                  Follow-up notes from your care team     Return in about 1 year (around 9/26/2018).      Your next 10 appointments already scheduled     Sep 25, 2018 11:00 AM CDT   (Arrive by 10:45 AM)   Return Liver Transplant with Le Simmons MD   [facility] Hepatology ([facility] [place])    [address] [place] [address] [phone]              Future tests that were ordered for you today     Open Standing Orders        Priority Remaining Interval Expires Ordered    CBC with platelets Routine 6/6 Every 2 Months 9/25/2018 9/25/2017    Basic metabolic panel Routine 6/6 Every 2 Months 9/25/2018 9/25/2017    Hepatic panel Routine 6/6 Every 2 Months 9/25/2018 9/25/2017    Tacrolimus level Routine 6/6 Every 2 Months 9/25/2018 9/25/2017    Protein  random urine with Creat Ratio Routine 1/1 Yearly 9/25/2018 9/25/2017    UA with Microscopic Routine 1/1 Yearly 9/25/2018 9/25/2017    Lipid Profile Routine 1/1 Yearly 9/25/2018 9/25/2017            Who to contact     If you have questions or need follow up information about today's clinic visit or your schedule please contact [facility] HEPATOLOGY directly at [phone].  Normal or non-critical lab and imaging results will be communicated to you by MyChart, letter or phone within 4 business days after the clinic has received the results. If you do not hear from us within 7 days, please contact the clinic through  "MyChart or phone. If you have a critical or abnormal lab result, we will notify you by phone as soon as possible.  Submit refill requests through [employer] or call your pharmacy and they will forward the refill request to us. Please allow 3 business days for your refill to be completed.          Additional Information About Your Visit        Care EveryWhere ID     This is your Care EveryWhere ID. This could be used by other organizations to access your [place] medical records  [id number]        Your Vitals Were     Pulse Temperature Respirations Height Pulse Oximetry BMI (Body Mass Index)    70 97.7  F (36.5  C) (Oral) 16 1.803 m (5' 10.98\") 95% 24.36 kg/m2       Blood Pressure from Last 3 Encounters:   09/26/17 160/87   04/13/17 143/85   01/12/17 129/77    Weight from Last 3 Encounters:   09/26/17 79.2 kg (174 lb 9.6 oz)   04/13/17 74.8 kg (165 lb)   09/27/16 75 kg (165 lb 6.4 oz)              We Performed the Following     DERMATOLOGY REFERRAL        Primary Care Provider Office Phone # Fax #    Joaquin Haines [phone]       [facility] [address]  [address] [facility] [address]        Equal Access to Services     MADDIE HERNANDEZ : Hadii cholo ku hadasho Soomaali, waaxda luqadaha, qaybta kaalmada adeegyada, waxay idiin haymalcolmn kayleen locke. So [facility] [phone].    ATENCIÓN: Si habla español, tiene a saleh disposición servicios gratuitos de asistencia lingüística. Llame al [phone].    We comply with applicable federal civil rights laws and [place] laws. We do not discriminate on the basis of race, color, national origin, age, disability sex, sexual orientation or gender identity.            Thank you!     Thank you for choosing [facility] HEPATOLOGY  for your care. Our goal is always to provide you with excellent care. Hearing back from our patients is one way we can continue to improve our services. Please take a few minutes to complete the written survey that you may " receive in the mail after your visit with us. Thank you!             Your Updated Medication List - Protect others around you: Learn how to safely use, store and throw away your medicines at www.disposemymeds.org.          This list is accurate as of: 9/26/17 10:28 AM.  Always use your most recent med list.                   Brand Name Dispense Instructions for use Diagnosis    amLODIPine 5 MG tablet    NORVASC     Take 5 mg by mouth daily.        ASPIRIN PO      Take 325 mg by mouth daily        fluticasone 110 MCG/ACT Inhaler    FLOVENT HFA     Inhale 2 puffs into the lungs 2 times daily    Liver replaced by transplant (H)       metoprolol 25 MG tablet    LOPRESSOR     Take 25 mg by mouth daily        MULTIVITAMIN PO      Take 1 tablet by mouth daily        tacrolimus 0.5 MG capsule    GENERIC EQUIVALENT    180 capsule    Take 3 capsules (1.5 mg) by mouth every 12 hours    Liver replaced by transplant (H)       ursodiol 300 MG capsule    ACTIGALL    60 capsule    TAKE ONE CAPSULE BY MOUTH TWICE A DAY    Liver replaced by transplant (H)       VENTOLIN  (90 BASE) MCG/ACT Inhaler   Generic drug:  albuterol      Inhale 2 puffs into the lungs every 6 hours.

## 2017-09-26 NOTE — NURSING NOTE
"Chief Complaint   Patient presents with     RECHECK     S/P Liver TX 10/30/2010       Initial /87 (BP Location: Right arm, Patient Position: Sitting, Cuff Size: Adult Regular)  Pulse 70  Temp 97.7  F (36.5  C) (Oral)  Resp 16  Ht 1.803 m (5' 10.98\")  Wt 79.2 kg (174 lb 9.6 oz)  SpO2 95%  BMI 24.36 kg/m2 Estimated body mass index is 24.36 kg/(m^2) as calculated from the following:    Height as of this encounter: 1.803 m (5' 10.98\").    Weight as of this encounter: 79.2 kg (174 lb 9.6 oz).  Medication Reconciliation: ron Gan [facility]  9/26/2017 9:53 AM        "

## 2017-09-26 NOTE — PROGRESS NOTES
[facility]    Hepatology follow-up    Follow-up visit for status post liver transplantation.     CONSULTING PHYSICIAN:  Joaquin Haines MD, [facility], [address], [address], [place] [address].     Subjective:  Mr. Murguia is a 67-year-old  male who was transplanted on 10/30/2010.  He, at that time, presented with decompensated liver disease of unclear etiology.  Post-liver transplantation, he recuperated very well and he had problems with his hip.  In fact, he did get a hip replacement and since we last saw him, also a knee replacement.  Symptom wise, today he does not have any symptoms at all.  He denies any edema.  His weight is stable, appetite is good.  His stamina is excellent.  He does have some asthma in the past and claims that except for that, he has no other issues.  He has no abdominal pain.  He is having good bowel movements.  He had a colonoscopy where adenomatous were identified and he is supposed to do a repeat colonoscopy in 2020.  He did also see Dermatology and he had basal cell carcinomas x2 and he will get another appointment for followup.  He measures his blood pressure at home which is okay.  His last lipid profile was also good. Patient denies fevers, sweats or chills.    Medical hx Surgical hx   Past Medical History:   Diagnosis Date     BCC (basal cell carcinoma)      Liver transplanted (H)      Unspecified cerebral artery occlusion with cerebral infarction October 2013      Past Surgical History:   Procedure Laterality Date     COLONOSCOPY N/A 1/12/2017    Procedure: COMBINED COLONOSCOPY, SINGLE OR MULTIPLE BIOPSY/POLYPECTOMY BY BIOPSY;  Surgeon: Le Simmons MD;  Location:  GI     MOHS MICROGRAPHIC PROCEDURE  3/7/13    right nasal ala, left upper back, right temple     TRANSPLANT            Medications  Prior to Admission medications    Medication Sig Start Date End Date Taking? Authorizing Provider  "  Multiple Vitamins-Minerals (MULTIVITAMIN PO) Take 1 tablet by mouth daily   Yes Reported, Patient   tacrolimus (GENERIC EQUIVALENT) 0.5 MG capsule Take 3 capsules (1.5 mg) by mouth every 12 hours 8/16/17  Yes Le Simmons MD   ursodiol (ACTIGALL) 300 MG capsule TAKE ONE CAPSULE BY MOUTH TWICE A DAY 10/10/16  Yes Le Simmons MD   fluticasone (FLOVENT HFA) 110 MCG/ACT inhaler Inhale 2 puffs into the lungs 2 times daily   Yes Reported, Patient   ASPIRIN PO Take 325 mg by mouth daily   Yes Reported, Patient   amLODIPine (NORVASC) 5 MG tablet Take 5 mg by mouth daily.   Yes Reported, Patient   metoprolol (LOPRESSOR) 25 MG tablet Take 25 mg by mouth daily    Yes Reported, Patient   Albuterol Sulfate (VENTOLIN HFA) 108 (90 BASE) MCG/ACT AERS Inhale 2 puffs into the lungs every 6 hours.   Yes Reported, Patient       Allergies  Allergies   Allergen Reactions     Levaquin [Levofloxacin] Shortness Of Breath       Review of systems  A 10-point review of systems was negative    Examination  /87 (BP Location: Right arm, Patient Position: Sitting, Cuff Size: Adult Regular)  Pulse 70  Temp 97.7  F (36.5  C) (Oral)  Resp 16  Ht 1.803 m (5' 10.98\")  Wt 79.2 kg (174 lb 9.6 oz)  SpO2 95%  BMI 24.36 kg/m2  Body mass index is 24.36 kg/(m^2).    Gen- well, NAD, A+Ox3, normal color  Lym- no palpable LAD  CVS- RRR  RS- CTA  Abd- Healed surgical scar.  Extr- hands normal, no VANGIE  Skin- no rash or jaundice  Neuro- no asterixis  Psych- normal mood    Laboratory  Lab Results   Component Value Date     09/20/2017    POTASSIUM 4.0 09/20/2017    CHLORIDE 104 09/20/2017    CO2 24 09/20/2017    BUN 18 09/20/2017    CR 1.05 09/20/2017       Lab Results   Component Value Date    BILITOTAL 0.6 09/20/2017    ALT 23 09/20/2017    AST 18 09/20/2017    ALKPHOS 62 09/20/2017       Lab Results   Component Value Date    ALBUMIN 3.8 09/20/2017    PROTTOTAL 7.7 09/20/2017        Lab Results   Component " Value Date    WBC 7.1 09/20/2017    HGB 13.4 09/20/2017    MCV 85 09/20/2017     09/20/2017       Lab Results   Component Value Date    INR 0.99 09/27/2012       Radiology    Assessment and plan:  Mr. Murguia got a liver transplantation on 10/30/2010.  He is doing quite well regarding that.  He does not have any kidney issues also.  His main issue, as I have said before, was arthritis and he did have both a hip and a knee replacement and he is doing well regarding that.  He is tolerating his immunosuppression medication very well.  We will continue that.  He was seen by Dermatology and, in fact, he had some basal cell cancers removed.  He follows also with a urologist regarding his history of bladder cancer.  Mr. Murguia is not due for a colonoscopy until 2020, at which time we will repeat it because of his serrate adenomas.  He will be seen here every year.      This was a 25-minute visit, of which more than 50% was spent in explaining to the patient what our plan of care was.  We answered all his questions.      cc:  Primary care physician       Le Simmons MD  Hepatology  [facility]

## 2017-09-26 NOTE — LETTER
9/26/2017      RE: Lupillo Murguia  [address] [place] [address]       [facility]    Hepatology follow-up    Follow-up visit for status post liver transplantation.     CONSULTING PHYSICIAN:  Joaquin Haines MD, [facility], [address], [address], [place], [place] [address].     Subjective:  Mr. Murguia is a 67-year-old  male who was transplanted on 10/30/2010.  He, at that time, presented with decompensated liver disease of unclear etiology.  Post-liver transplantation, he recuperated very well and he had problems with his hip.  In fact, he did get a hip replacement and since we last saw him, also a knee replacement.  Symptom wise, today he does not have any symptoms at all.  He denies any edema.  His weight is stable, appetite is good.  His stamina is excellent.  He does have some asthma in the past and claims that except for that, he has no other issues.  He has no abdominal pain.  He is having good bowel movements.  He had a colonoscopy where adenomatous were identified and he is supposed to do a repeat colonoscopy in 2020.  He did also see Dermatology and he had basal cell carcinomas x2 and he will get another appointment for followup.  He measures his blood pressure at home which is okay.  His last lipid profile was also good. Patient denies fevers, sweats or chills.    Medical hx Surgical hx   Past Medical History:   Diagnosis Date     BCC (basal cell carcinoma)      Liver transplanted (H)      Unspecified cerebral artery occlusion with cerebral infarction October 2013      Past Surgical History:   Procedure Laterality Date     COLONOSCOPY N/A 1/12/2017    Procedure: COMBINED COLONOSCOPY, SINGLE OR MULTIPLE BIOPSY/POLYPECTOMY BY BIOPSY;  Surgeon: Le Simmons MD;  Location:  GI     MOHS MICROGRAPHIC PROCEDURE  3/7/13    right nasal ala, left upper back, right temple     TRANSPLANT            Medications  Prior to Admission  "medications    Medication Sig Start Date End Date Taking? Authorizing Provider   Multiple Vitamins-Minerals (MULTIVITAMIN PO) Take 1 tablet by mouth daily   Yes Reported, Patient   tacrolimus (GENERIC EQUIVALENT) 0.5 MG capsule Take 3 capsules (1.5 mg) by mouth every 12 hours 8/16/17  Yes Le Simmons MD   ursodiol (ACTIGALL) 300 MG capsule TAKE ONE CAPSULE BY MOUTH TWICE A DAY 10/10/16  Yes Le Simmons MD   fluticasone (FLOVENT HFA) 110 MCG/ACT inhaler Inhale 2 puffs into the lungs 2 times daily   Yes Reported, Patient   ASPIRIN PO Take 325 mg by mouth daily   Yes Reported, Patient   amLODIPine (NORVASC) 5 MG tablet Take 5 mg by mouth daily.   Yes Reported, Patient   metoprolol (LOPRESSOR) 25 MG tablet Take 25 mg by mouth daily    Yes Reported, Patient   Albuterol Sulfate (VENTOLIN HFA) 108 (90 BASE) MCG/ACT AERS Inhale 2 puffs into the lungs every 6 hours.   Yes Reported, Patient       Allergies  Allergies   Allergen Reactions     Levaquin [Levofloxacin] Shortness Of Breath       Review of systems  A 10-point review of systems was negative    Examination  /87 (BP Location: Right arm, Patient Position: Sitting, Cuff Size: Adult Regular)  Pulse 70  Temp 97.7  F (36.5  C) (Oral)  Resp 16  Ht 1.803 m (5' 10.98\")  Wt 79.2 kg (174 lb 9.6 oz)  SpO2 95%  BMI 24.36 kg/m2  Body mass index is 24.36 kg/(m^2).    Gen- well, NAD, A+Ox3, normal color  Lym- no palpable LAD  CVS- RRR  RS- CTA  Abd- Healed surgical scar.  Extr- hands normal, no VANGIE  Skin- no rash or jaundice  Neuro- no asterixis  Psych- normal mood    Laboratory  Lab Results   Component Value Date     09/20/2017    POTASSIUM 4.0 09/20/2017    CHLORIDE 104 09/20/2017    CO2 24 09/20/2017    BUN 18 09/20/2017    CR 1.05 09/20/2017       Lab Results   Component Value Date    BILITOTAL 0.6 09/20/2017    ALT 23 09/20/2017    AST 18 09/20/2017    ALKPHOS 62 09/20/2017       Lab Results   Component Value Date    " ALBUMIN 3.8 09/20/2017    PROTTOTAL 7.7 09/20/2017        Lab Results   Component Value Date    WBC 7.1 09/20/2017    HGB 13.4 09/20/2017    MCV 85 09/20/2017     09/20/2017       Lab Results   Component Value Date    INR 0.99 09/27/2012       Radiology    Assessment and plan:  Mr. Murguia got a liver transplantation on 10/30/2010.  He is doing quite well regarding that.  He does not have any kidney issues also.  His main issue, as I have said before, was arthritis and he did have both a hip and a knee replacement and he is doing well regarding that.  He is tolerating his immunosuppression medication very well.  We will continue that.  He was seen by Dermatology and, in fact, he had some basal cell cancers removed.  He follows also with a urologist regarding his history of bladder cancer.  Mr. Murguia is not due for a colonoscopy until 2020, at which time we will repeat it because of his serrate adenomas.  He will be seen here every year.      This was a 25-minute visit, of which more than 50% was spent in explaining to the patient what our plan of care was.  We answered all his questions.      cc:  Primary care physician       Le Simmons MD  Hepatology  [facility]

## 2017-09-27 ENCOUNTER — TELEPHONE (OUTPATIENT)
Dept: DERMATOLOGY | Facility: CLINIC | Age: 67
End: 2017-09-27

## 2017-09-28 NOTE — TELEPHONE ENCOUNTER
"History of Skin cancer : yes    History of Mohs Surgery: yes    History of a solid organ transplant: yes Organ(s): liver Year(s): 2010 Creatinine: 1.05 Bone Marrow Transplant : no (year, )    Immunosuppressive Medications: yes (if yes, which ones: tacrolimus)    HIV or Hepatitis B or C : no    Chronic lymphocytic leukemia: no    Diabetes: no (Type 1 or 2: )    Any Bleeding disorders: no    Do you have a Pacemaker or Defibrillator: no (year placed: )    Artificial heart valve: no (mechanical or porcine: )    Joint Replacement in the last 2 years: yes Joint(s): hips-12/2015 12/2016; knee-10/2016    Do you typically take Prophylactic Antibiotics before seeing a dentist or having a procedure?: yes        Needs ABX   Per pt. \"Amoxicillin\" Does not know dose    If yes, verify that patient has the antibiotic on hand and instruct to take one hour before their surgery appointment.    If they do not have the antibiotic, verify the patients pharmacy and notify Dr. Mackenzie by printing the pre-mohs call sheet.    Do you wear a C Pap Mask: no    If yes, and procedure is on the face, ask patient to bring in the mask with them (Mask only)    Do you have any mobility issues: no    If yes, is a van service is required to transport you to/from your appointment? no    Smoking History (tobacco of any sort): yes 40 years ago     Do you have any other health issues that we should know about? Bladder CA, Liver transplant     Do you take any of the following Blood Thinners:    Aspirin: 320mg     Plavix/Aggrastat/Brilinta: no    Warfarin: no (Last INR:  Date: )    Pradaxa/Eliquis/Xarelto: no    Ibuprofen (Advil/Motrin): PRN    Naproxen (Aleve): no    Vitamin E: no    Fish Oil: no    Ginkgo biloba: no    Other:    Yes Paient was instructed of the following: Ibuprofen, naproxen, Vitamin E, Fish Oil, and Ginkgo biloba should be stopped 1 week prior to and 1 week after the procedure.    Medication Allergies: Levoquin   Are medications in Epic: " see epic    Yes-Patient was reminded to take all medications as usual, other than those listed above, on the day of surgery    Yes-Patient was instructed to bring all medications to clinic on day of surgery    Yes Patient will bring a     (A  is helpful for the following reasons: some patients need medications to relax, but once given, patients should not drive; sometimes bandages obstruct your vision making it difficult to see and unsafe to drive; the day can get long so it s nice to have a mike; sometimes the procedure wears people out/makes them quite tired)    Yes- Photograph of the surgical site(s) is/are available    Yes- Patient was reminded that this can be an all-day procedure and that no other appointments should be scheduled on the day of Mohs      Advised patient to cut hair prior to appointment.     Eliane Krishnan RN

## 2017-09-30 NOTE — TELEPHONE ENCOUNTER
I am not sure this patient needs antibiotics before surgery; his case is not until Tuesday. I will defer to Dr. Murray's expertise as the amoxicillin can be given to him Monday.

## 2017-10-02 RX ORDER — CEPHALEXIN 500 MG/1
2000 CAPSULE ORAL ONCE
Qty: 4 CAPSULE | Refills: 0 | Status: SHIPPED | OUTPATIENT
Start: 2017-10-02 | End: 2017-10-02

## 2017-10-03 ENCOUNTER — TELEPHONE (OUTPATIENT)
Dept: DERMATOLOGY | Facility: CLINIC | Age: 67
End: 2017-10-03

## 2017-10-03 ENCOUNTER — OFFICE VISIT (OUTPATIENT)
Dept: DERMATOLOGY | Facility: CLINIC | Age: 67
End: 2017-10-03

## 2017-10-03 VITALS — HEART RATE: 76 BPM | DIASTOLIC BLOOD PRESSURE: 81 MMHG | SYSTOLIC BLOOD PRESSURE: 139 MMHG

## 2017-10-03 DIAGNOSIS — C44.41 BASAL CELL CARCINOMA OF SKIN OF SCALP AND NECK: Primary | ICD-10-CM

## 2017-10-03 ASSESSMENT — PAIN SCALES - GENERAL: PAINLEVEL: NO PAIN (0)

## 2017-10-03 NOTE — TELEPHONE ENCOUNTER
History of Skin cancer : Yes    History of Mohs Surgery: Yes    History of a solid organ transplant: yes Organ(s): Liver Year(s): 2010 Creatinine:  Bone Marrow Transplant : no (year, )    Immunosuppressive Medications: [place]  (if yes, which ones: )    HIV or Hepatitis B or C : No    Chronic lymphocytic leukemia: No    Diabetes: No (Type 1 or 2: )    Any Bleeding disorders: no    Do you have a Pacemaker or Defibrillator: no (year placed: )    Artificial heart valve: no (mechanical or porcine: )    Joint Replacement in the last 2 years: yes Left Hip Right knee Joint(s):  Year(s): 2016    Do you typically take Prophylactic Antibiotics before seeing a dentist or having a procedure?: Yes- he has already taken the RX    If yes, verify that patient has the antibiotic on hand and instruct to take one hour before their surgery appointment.    If they do not have the antibiotic, verify the patients pharmacy and notify Dr. Mackenzie by printing the pre-mohs call sheet.    Do you wear a C Pap Mask: no    If yes, and procedure is on the face, ask patient to bring in the mask with them (Mask only)    Do you have any mobility issues: no    If yes, is a van service is required to transport you to/from your appointment? no    Smoking History (tobacco of any sort): no    Do you have any other health issues that we should know about? no    Do you take any of the following Blood Thinners:    Aspirin: Yes, 325mg     Plavix/Aggrastat/Brilinta: no    Warfarin: no (Last INR:  Date: )    Pradaxa/Eliquis/Xarelto: no    Ibuprofen (Advil/Motrin): no    Naproxen (Aleve): no    Vitamin E: no    Fish Oil: no    Ginkgo biloba: no    Other:    Done-Paient was instructed of the following: Ibuprofen, naproxen, Vitamin E, Fish Oil, and Ginkgo biloba should be stopped 1 week prior to and 1 week after the procedure.    Medication Allergies: in EHR     Are medications in Epic: In EHR    Done-Patient was reminded to take all medications as usual,  other than those listed above, on the day of surgery    Done-Patient was instructed to bring all medications to clinic on day of surgery    Done-Patient will bring a     (A  is helpful for the following reasons: some patients need medications to relax, but once given, patients should not drive; sometimes bandages obstruct your vision making it difficult to see and unsafe to drive; the day can get long so it s nice to have a mike; sometimes the procedure wears people out/makes them quite tired)    Done-Photograph of the surgical site(s) is/are available    Done-Patient was reminded that this can be an all-day procedure and that no other appointments should be scheduled on the day of Mohs

## 2017-10-03 NOTE — Clinical Note
"Oscar Del Rio,   I tried to close this encounter, but I got an error message that you have an incomplete note in the encounter. It is considered incomplete because the box at the bottom does not have \"sign on close of encounter\" selected.   Please open the note, change the selection and let me know. I will get it closed.   Thank you. "

## 2017-10-03 NOTE — NURSING NOTE
Closure performed on the vertex scalp. Injected 27.0 ml of Xylocaine  (Lidocaine 1.5% and Epinephrine  (1:200,000))  LT    Defect Photo: DH  Closure Photo: none    Present for procedure:   Dr. Flex Morse Resident MD Enma Morataya CMA    The area was cleaned and dressed with Vaseline, Telfa and a pressure dressing. Wound care instructions were gone over with the patient and he voiced that he understood.   Enma Morataya CMA

## 2017-10-03 NOTE — NURSING NOTE
Chief Complaint   Patient presents with     Skin Cancer     Mr. Murguia is here today to have MOHS on the Vertex Scalp.      Enma Morataya CMA

## 2017-10-03 NOTE — PROGRESS NOTES
DERMATOLOGIC SURGERY REPORT    NAME OF PROCEDURE:  MOHS MICROGRAPHIC SURGERY    Surgeon: Flex Murray D.O.  Fellow:  None  Resident: Davion Rust  Mohs ID:  M      PREOPERATIVE DIAGNOSIS:   Basal cell carcinoma, nodular type, extending to the deep margin  POSTOPERATIVE DIAGNOSIS:   Same    INDICATIONS:  This patient presented with a basal cell carcinoma, nodular type, extending to the deep margin located on the right vertex scalp, measuring 2.5 cm x 2.3 cm.  Because of its size, location and morphology, Mohs surgery was indicated.  After appropriate discussion and informed consent the patient underwent Mohs surgery using the fresh tissue technique as follows:    STAGE I:  The patient was placed on the operating room table.  The area was infiltrated with 1.5% lidocaine and epinephrine mixed 1:200,000. Using a #15-blade, complete excision was made around the tumor in one section.  Hemostasis was obtained by electrodessication.  A dressing was placed.  Tissue was divided into one tissue block which were subsequently mapped, color coded at their margins and processed in the Mohs Laboratory.  Microscopic tumor was not found in the tissue blocks.    With the lesion clear of micrographic tumor, surgery was considered complete.  The defect extended to the fascia and measured 3.6 cm x 3.8 cm. After discussion with patient, the defect was reconstructed. The total amount of anesthesia used was 27 ml. The estimated blood loss was less than 25 ml. The patient was discharged alert and ambulatory.    [facility]  [address] [place] [id number]     Flex Murray DO  , Dept. of Dermatology                                                                             [facility]  Voice: [phone]  Fax: [phone]        DERMATOLOGIC SURGERY REPORT    NAME OF PROCEDURE: ROTATION FLAP    Surgeon: Flex Murray D.O.  Fellow:  None  Resident: Davion  Barrrea    PREOPERATIVE DIAGNOSIS:   Status post Mohs excision of a basal cell carcinoma, nodular type, extending to the deep margin  POSTOPERATIVE DIAGNOSIS:   Same    INDICATIONS:  This patient was left with a 3.6 cm x 3.8 cm defect involving the right vertex area following Mohs excision of a basal cell carcinoma, nodular type.  In order to repair this defect while maintaining the normal anatomic relations and function, we elected to utilize a rotation flap closure.  We discussed the principles of treatment and most likely complications including bleeding, infection, wound dehiscence, flap necrosis, and scarring.  Informed consent was obtained and the patient underwent the procedure as follows.    PROCEDURE:  The patient was taken to the operative suite and placed on the operating room table.  The treatment area was anesthetized with 1.5% Lidocaine and epinephrine mixed 1:200,000. The area was washed with Hibiclens, rinsed with saline and draped with sterile towels. The wound edges were extensively undermined and debeveled.  The rotation flap was designed, incised and elevated.  Hemostasis was obtained by bipolar electrocoagulation.  The key deep sutures were placed along the advancing edges of the flap using buried 3-0 Vicryl sutures. The remaining deep subcutaneous and dermal closure was performed using buried vertical mattress 3-0 Vicryl sutures.  Two redundant cutaneous cones were removed by triangulation. Final cutaneous approximation was achieved with 5-0 Prolene running locked sutures.       The final flap size was 7 cm x 9 cm totaling 63 cm .  The total tissue transfer and rearrangement measured 63 cm .  Total anesthesia used was 27 ml and estimated blood loss was less than 25 ml.  A sterile pressure dressing was applied and wound care instructions, with a written handout, were given. The patient was discharged from the [facility] alert and ambulatory.    Two staff members  incurred needle stick injuries during the wound repair portion of the procedure. Each staff member and the patient had blood testing to screen for infectious disease.     I personally performed all key portions of the procedure. I was immediately available for all portions of the procedure that the resident performed.     [facility]  [address] [place] [id number]     Flex Murray DO  , Dept. of Dermatology                                                                             [facility]  Voice: [phone]  Fax: [phone]                *Note: The patient became frustrated with the amount of residual blood on his scalp at the conclusion of the repair. He left in haste. No reconstruction photos were obtained.

## 2017-10-03 NOTE — MR AVS SNAPSHOT
After Visit Summary   10/3/2017    Lupillo Murguia    MRN: [id number]           Patient Information     Date Of Birth          1950        Visit Information        Provider Department      10/3/2017 8:30 AM Flex Murray MD [facility] Dermatologic Surgery        Care Instructions    Sutured Wound Care  Caring for your skin after surgery:    After your surgery, a pressure bandage will be placed over the area that has stitches. This will prevent bleeding. Please follow these instructions over the next 1 to 2 weeks. Following this regimen will help to prevent complications as your wound heals.      No strenuous activity for 48 hours. Resume moderate activity in 48 hours. No heavy exercising until you are seen for follow up.    Take Tylenol one hour after surgery. Take Tylenol every 4 hours as needed and do not take any aspirin products for pain (continue taking aspirin if prescribed by your doctor to prevent heart attacks and strokes).    Do not drink alcoholic beverages for 48 hours.    No dietary restrictions.    Keep the pressure bandage in place for 24 hours. If the bandage becomes blood tinged or loose, reinforce it with gauze and tape.     Keep the bandage dry. Wash around it carefully    If the tape becomes soiled or starts to come off, reinforce it with additional paper tape.    Do not smoke for 3 weeks; smoking is detrimental to wound healing.    It is normal to have swelling and bruising around the surgical site. The bruising will fade in approximately 10-14 days. Elevate the area to reduce swelling.    Numbness, itchiness and sensitivity to temperature changes can occur after surgery and may take up to 18 months to normalize.  Remove the outer pressure bandage in 24 hours.   Wash daily with water  Apply Vaseline and a  Non stick bandage.  Please follow up in 1 week for suture removal.         Bleeding:  You may see a small amount of drainage or blood on your pressure dressing. This is normal  and the following instructions should be followed if the wound is bleeding saturates the dressing.    1. Leave the bandage in place.  2. Use tightly rolled up gauze or a cloth to apply direct pressure over the bandage for 20 minutes.  3. Reapply pressure for an additional 20 minutes if necessary.  4. Call the office or go to the nearest emergency room if pressure fails to stop the bleeding.  5. Use additional gauze and tape to maintain pressure once the bleeding has stopped.    Pain:  1. Post operative pain should slowly get better, never worse.  2. A severe increase in pain may indicate a problem. Call the office if this occurs.  3. You may use ice directly over the dressing, but make sure the dressing does not get wet.    Phone numbers:  During business hours (M-F 8:00-4:30 p.m.)  [facility]-  [phone] Option 1 appt. desk  [phone]  Option 3 nurse triage line  ---------------------------------------------------------  Evenings/Weekends/Holidays  Hospital - [phone]   TTY for hearing [email or web address]-[phone]  *Ask  to page dermatologist on-call  Emergency Ihxl-[phone]  TTY for hearing impaired- [phone]            Follow-ups after your visit        Your next 10 appointments already scheduled     Oct 10, 2017  8:30 AM CDT   (Arrive by 8:15 AM)   Return Mohs with Flex Murray MD   [facility] Dermatologic Surgery ([facility])    [address] [address] [phone]            Sep 25, 2018 11:00 AM CDT   (Arrive by 10:45 AM)   Return Liver Transplant with Le Simmons MD   [facility] Hepatology ([facility])    [address] [address] [phone]              Who to contact     Please call your clinic at [phone] to:    Ask questions about your health    Make or cancel appointments    Discuss your medicines    Learn  about your test results    Speak to your doctor   If you have compliments or concerns about an experience at your clinic, or if you wish to file a complaint, please contact [facility] Patient Relations at [phone] or email us at [email or web address]         Additional Information About Your Visit        Care EveryWhere ID     This is your Care EveryWhere ID. This could be used by other organizations to access your [place] medical records  [id number]        Your Vitals Were     Pulse                   76            Blood Pressure from Last 3 Encounters:   10/03/17 139/81   09/26/17 160/87   04/13/17 143/85    Weight from Last 3 Encounters:   09/26/17 79.2 kg (174 lb 9.6 oz)   04/13/17 74.8 kg (165 lb)   09/27/16 75 kg (165 lb 6.4 oz)              Today, you had the following     No orders found for display       Primary Care Provider Office Phone # Fax #    Joaquin Haines [phone]       [facility] [address]  [address] [facility] [address]        Equal Access to Services     [facility]: Hadii cholo ku hadelham Sodev, waaxda luajit, qaybta kaalash schulz, felix locke. So [facility] [phone].    ATENCIÓN: Si habla español, tiene a saleh disposición servicios gratdanos de asistencia lingüística. Catracho al [phone].    We comply with applicable federal civil rights laws and [place] laws. We do not discriminate on the basis of race, color, national origin, age, disability, sex, sexual orientation, or gender identity.            Thank you!     Thank you for choosing [facility] DERMATOLOGIC SURGERY  for your care. Our goal is always to provide you with excellent care. Hearing back from our patients is one way we can continue to improve our services. Please take a few minutes to complete the written survey that you may receive in the mail after your visit with us. Thank you!             Your Updated Medication  List - Protect others around you: Learn how to safely use, store and throw away your medicines at www.disposemymeds.org.          This list is accurate as of: 10/3/17  2:58 PM.  Always use your most recent med list.                   Brand Name Dispense Instructions for use Diagnosis    amLODIPine 5 MG tablet    NORVASC     Take 5 mg by mouth daily.        ASPIRIN PO      Take 325 mg by mouth daily        fluticasone 110 MCG/ACT Inhaler    FLOVENT HFA     Inhale 2 puffs into the lungs 2 times daily    Liver replaced by transplant (H)       metoprolol 25 MG tablet    LOPRESSOR     Take 25 mg by mouth daily        MULTIVITAMIN PO      Take 1 tablet by mouth daily        * tacrolimus 0.5 MG 24 hr capsule    ASTAGRAF XL          * tacrolimus 0.5 MG capsule    GENERIC EQUIVALENT    180 capsule    Take 3 capsules (1.5 mg) by mouth every 12 hours    Liver replaced by transplant (H)       ursodiol 300 MG capsule    ACTIGALL    60 capsule    TAKE ONE CAPSULE BY MOUTH TWICE A DAY    Liver replaced by transplant (H)       VENTOLIN  (90 BASE) MCG/ACT Inhaler   Generic drug:  albuterol      Inhale 2 puffs into the lungs every 6 hours.        * Notice:  This list has 2 medication(s) that are the same as other medications prescribed for you. Read the directions carefully, and ask your doctor or other care provider to review them with you.

## 2017-10-03 NOTE — LETTER
10/3/2017       RE: Lupillo Murguia  [address] [place] [address]     Dear Colleague,    Thank you for referring your patient, Lupillo Murguia, to the [facility] DERMATOLOGIC SURGERY at [facility]. Please see a copy of my visit note below.    DERMATOLOGIC SURGERY REPORT    NAME OF PROCEDURE:  MOHS MICROGRAPHIC SURGERY    Surgeon: Flex Murray D.O.  Fellow:  None  Resident: Davion Rust  Mohs ID:  M      PREOPERATIVE DIAGNOSIS:   Basal cell carcinoma, nodular type, extending to the deep margin  POSTOPERATIVE DIAGNOSIS:   Same    INDICATIONS:  This patient presented with a basal cell carcinoma, nodular type, extending to the deep margin located on the right vertex scalp, measuring 2.5 cm x 2.3 cm.  Because of its size, location and morphology, Mohs surgery was indicated.  After appropriate discussion and informed consent the patient underwent Mohs surgery using the fresh tissue technique as follows:    STAGE I:  The patient was placed on the operating room table.  The area was infiltrated with 1.5% lidocaine and epinephrine mixed 1:200,000. Using a #15-blade, complete excision was made around the tumor in one section.  Hemostasis was obtained by electrodessication.  A dressing was placed.  Tissue was divided into one tissue block which were subsequently mapped, color coded at their margins and processed in the Mohs Laboratory.  Microscopic tumor was not found in the tissue blocks.    With the lesion clear of micrographic tumor, surgery was considered complete.  The defect extended to the fascia and measured 3.6 cm x 3.8 cm. After discussion with patient, the defect was reconstructed. The total amount of anesthesia used was 27 ml. The estimated blood loss was less than 25 ml. The patient was discharged alert and ambulatory.    [facility] Dermatopathology [facility]  [address] [place] [address]     Flex Murray DO  , Dept. of Dermatology                                                                              [facility]  Voice: [phone]  Fax: [phone]        DERMATOLOGIC SURGERY REPORT    NAME OF PROCEDURE: ROTATION FLAP    Surgeon: Flex Murray D.O.  Fellow:  None  Resident: Davion Rust    PREOPERATIVE DIAGNOSIS:   Status post Mohs excision of a basal cell carcinoma, nodular type, extending to the deep margin  POSTOPERATIVE DIAGNOSIS:   Same    INDICATIONS:  This patient was left with a 3.6 cm x 3.8 cm defect involving the right vertex area following Mohs excision of a basal cell carcinoma, nodular type.  In order to repair this defect while maintaining the normal anatomic relations and function, we elected to utilize a rotation flap closure.  We discussed the principles of treatment and most likely complications including bleeding, infection, wound dehiscence, flap necrosis, and scarring.  Informed consent was obtained and the patient underwent the procedure as follows.    PROCEDURE:  The patient was taken to the operative suite and placed on the operating room table.  The treatment area was anesthetized with 1.5% Lidocaine and epinephrine mixed 1:200,000. The area was washed with Hibiclens, rinsed with saline and draped with sterile towels. The wound edges were extensively undermined and debeveled.  The rotation flap was designed, incised and elevated.  Hemostasis was obtained by bipolar electrocoagulation.  The key deep sutures were placed along the advancing edges of the flap using buried 3-0 Vicryl sutures. The remaining deep subcutaneous and dermal closure was performed using buried vertical mattress 3-0 Vicryl sutures.  Two redundant cutaneous cones were removed by triangulation. Final cutaneous approximation was achieved with 5-0 Prolene running locked sutures.       The final flap size was 7 cm x 9 cm totaling 63 cm .  The total tissue transfer and rearrangement measured 63 cm .  Total anesthesia used was 27 ml  and estimated blood loss was less than 25 ml.  A sterile pressure dressing was applied and wound care instructions, with a written handout, were given. The patient was discharged from the [facility] alert and ambulatory.    Two staff members incurred needle stick injuries during the wound repair portion of the procedure. Each staff member and the patient had blood testing to screen for infectious disease.     I personally performed all key portions of the procedure. I was immediately available for all portions of the procedure that the resident performed.     [facility] Dermatopathology Lab  [address] [place] [id number]     Flex Murray DO  , Dept. of Dermatology                                                                             [facility]  Voice: [phone]  Fax: [phone]                *Note: The patient became frustrated with the amount of residual blood on his scalp at the conclusion of the repair. He left in haste. No reconstruction photos were obtained.

## 2017-10-03 NOTE — PATIENT INSTRUCTIONS
Sutured Wound Care  Caring for your skin after surgery:    After your surgery, a pressure bandage will be placed over the area that has stitches. This will prevent bleeding. Please follow these instructions over the next 1 to 2 weeks. Following this regimen will help to prevent complications as your wound heals.      No strenuous activity for 48 hours. Resume moderate activity in 48 hours. No heavy exercising until you are seen for follow up.    Take Tylenol one hour after surgery. Take Tylenol every 4 hours as needed and do not take any aspirin products for pain (continue taking aspirin if prescribed by your doctor to prevent heart attacks and strokes).    Do not drink alcoholic beverages for 48 hours.    No dietary restrictions.    Keep the pressure bandage in place for 24 hours. If the bandage becomes blood tinged or loose, reinforce it with gauze and tape.     Keep the bandage dry. Wash around it carefully    If the tape becomes soiled or starts to come off, reinforce it with additional paper tape.    Do not smoke for 3 weeks; smoking is detrimental to wound healing.    It is normal to have swelling and bruising around the surgical site. The bruising will fade in approximately 10-14 days. Elevate the area to reduce swelling.    Numbness, itchiness and sensitivity to temperature changes can occur after surgery and may take up to 18 months to normalize.  Remove the outer pressure bandage in 24 hours.   Wash daily with water  Apply Vaseline and a  Non stick bandage.  Please follow up in 1 week for suture removal.         Bleeding:  You may see a small amount of drainage or blood on your pressure dressing. This is normal and the following instructions should be followed if the wound is bleeding saturates the dressing.    1. Leave the bandage in place.  2. Use tightly rolled up gauze or a cloth to apply direct pressure over the bandage for 20 minutes.  3. Reapply pressure for an additional 20 minutes if  necessary.  4. Call the office or go to the nearest emergency room if pressure fails to stop the bleeding.  5. Use additional gauze and tape to maintain pressure once the bleeding has stopped.    Pain:  1. Post operative pain should slowly get better, never worse.  2. A severe increase in pain may indicate a problem. Call the office if this occurs.  3. You may use ice directly over the dressing, but make sure the dressing does not get wet.    Phone numbers:  During business hours (M-F 8:00-4:30 p.m.)  [facility]-  [phone] Option 1 appt. desk  [phone]  Option 3 nurse triage line  ---------------------------------------------------------  Evenings/Weekends/Holidays  Hospital - [phone]   TTY for hearing [phone]  *Ask  to page dermatologist on-call  Emergency Ditm-[phone]  TTY for hearing impaired- [phone]

## 2017-10-03 NOTE — NURSING NOTE
1st layer performed on the Vertex Scalp. Injected 6.0ml of Xylocaine  (Lidocaine 1.5% and Epinephrine  (1:200,000)      Present for procedure:    Dr. Flex Morse, Resident MD Enma Morataya, [facility]

## 2017-10-10 ENCOUNTER — OFFICE VISIT (OUTPATIENT)
Dept: DERMATOLOGY | Facility: CLINIC | Age: 67
End: 2017-10-10

## 2017-10-10 VITALS — DIASTOLIC BLOOD PRESSURE: 94 MMHG | HEART RATE: 80 BPM | SYSTOLIC BLOOD PRESSURE: 154 MMHG

## 2017-10-10 DIAGNOSIS — C44.319 BASAL CELL CARCINOMA OF RIGHT CHEEK: ICD-10-CM

## 2017-10-10 DIAGNOSIS — C44.41 BASAL CELL CARCINOMA OF SKIN OF SCALP AND NECK: Primary | ICD-10-CM

## 2017-10-10 ASSESSMENT — PAIN SCALES - GENERAL: PAINLEVEL: NO PAIN (0)

## 2017-10-10 NOTE — PROGRESS NOTES
[facility] Dermatology Note      Dermatology Problem List:  1. Basal Cell Carcinoma Scalp, Right Vertex - treated with Mohs 10/03/17  2. Basal Cell Carcinoma Right Jawline - Mohs Scheduled Today    CC:   Chief Complaint   Patient presents with     Skin Cancer               Encounter Date: Oct 10, 2017    History of Present Illness:  Mr. Lupillo Murguia is a 67 year old male presenting to clinic. He believed this visit was for suture removal, not a second Mohs procedure. He is disappointed that such a large piece was excised at last week's surgery. He expected the surgery to be performed with multiple small layers instead. He feels that smaller layers may have cured it, but he was not given that chance. Aside from the size and extent of the surgery last week, he denies any wound healing complications. His scalp is a little painful, but it is tolerable. He denies excessive bleeding.     He does not want to have surgery on the BCC on his right jawline today.     Past Medical History:   Patient Active Problem List   Diagnosis     Liver replaced by transplant (H)     BCC (basal cell carcinoma)     Bladder cancer (H)     Skin exam, screening for cancer     Past Medical History:   Diagnosis Date     BCC (basal cell carcinoma)      Liver transplanted (H)      Unspecified cerebral artery occlusion with cerebral infarction October 2013     Past Surgical History:   Procedure Laterality Date     COLONOSCOPY N/A 1/12/2017    Procedure: COMBINED COLONOSCOPY, SINGLE OR MULTIPLE BIOPSY/POLYPECTOMY BY BIOPSY;  Surgeon: Le Simmons MD;  Location:  GI     MOHS MICROGRAPHIC PROCEDURE  3/7/13    right nasal ala, left upper back, right temple     TRANSPLANT         Medications:  Current Outpatient Prescriptions   Medication Sig Dispense Refill     tacrolimus (ASTAGRAF XL) 0.5 MG 24 hr capsule        Multiple Vitamins-Minerals (MULTIVITAMIN PO) Take 1 tablet by mouth daily        tacrolimus (GENERIC EQUIVALENT) 0.5 MG capsule Take 3 capsules (1.5 mg) by mouth every 12 hours 180 capsule 11     ursodiol (ACTIGALL) 300 MG capsule TAKE ONE CAPSULE BY MOUTH TWICE A DAY 60 capsule 11     fluticasone (FLOVENT HFA) 110 MCG/ACT inhaler Inhale 2 puffs into the lungs 2 times daily       ASPIRIN PO Take 325 mg by mouth daily       amLODIPine (NORVASC) 5 MG tablet Take 5 mg by mouth daily.       metoprolol (LOPRESSOR) 25 MG tablet Take 25 mg by mouth daily        Albuterol Sulfate (VENTOLIN HFA) 108 (90 BASE) MCG/ACT AERS Inhale 2 puffs into the lungs every 6 hours.       Allergies   Allergen Reactions     Levaquin [Levofloxacin] Shortness Of Breath         Review of Systems:  -Constitutional: The patient denies fatigue, fevers, chills, unintended weight loss, and night sweats.  -Skin: As above in HPI. No additional skin concerns.    Physical exam:  Vitals: BP (!) 154/94 (BP Location: Left arm, Patient Position: Chair, Cuff Size: Adult Regular)  Pulse 80  GEN: This is a well developed, well-nourished male in no acute distress, in a pleasant mood.    SKIN: Focused examination of the Scalp and face was performed.  - Rotation Flap wound is clean and intact. Some hemorrhagic crust along the repair line.    Non-tender to palpation. Minimal redness at the repair edges.   - Right jawline - pink pearly papule with central depression   -No other lesions of concern on areas examined.     Impression/Plan:  1. Basal Cell Carcinoma - right vertex scalp    Wound appears to be healing well. Flap is well perfused. No clinical signs of infection.     Patient did not present for a consult visit prior to surgery. Though we discussed the nature, risks, and benefits of surgery prior to the procedure last week, it seems that his expectations were that smaller layers should have taken. I explained that our goal is excise tissue to clear the skin cancer with each layer. I explained the purpose of curetting the wound is to get  a better clinical sense of the size and depth of the skin cancer. The first layer can be more accurately estimated after curettage and is taken in healthy tissue adjacent to the ulcer. I do not make a practice of excising shallow layers on purpose.     Suture removal should occur in 1 week (a total of 2 weeks).     2. Basal Cell Carcinoma, right jawline    Owing to the extent of the first surgery, the patient would like to have a second opinion or possibly not have the BCC on his jawline treated. He does not want surgery today.     At his initial visit, he stated that he could no longer see the site biopsied on his jawline. He inquired about not having the surgery.     At that time, my medical opinion was that the skin cancer is still present on his right jawline. The path report also confirmed the incomplete removal by the biopsy. My advice to him was to have the BCC treated while the lesion is small. Delaying surgery will allow the skin cancer to continue growing and lead to a more invasive surgery.     It appears the patient is minimizing the risk and outcomes of these skin cancers to rationalize delaying treatment.     My medical advice remains to have the skin cancer treated.     He would like to pursue a second opinion. I offered him a referral to Dr. Mackenzie or Dr. Michelle (a surgeon who's work he was previously satisfied with), but he declined.     He scheduled his suture removal appointment for one week.     CC Dr. Barahona on close of this encounter.    Staff Involved:  Staff Only    Flex Murray DO    Department of Dermatology  [facility]: Phone: [phone], Fax:[phone]  [facility]: Phone: [phone], Fax: [phone]

## 2017-10-10 NOTE — NURSING NOTE
Chief Complaint   Patient presents with     Skin Cancer     Mr. Murguia is here today to have MOHS on the RIght Jawline for a BCC.     Enma Morataya, CMA

## 2017-10-10 NOTE — MR AVS SNAPSHOT
After Visit Summary   10/10/2017    Lupillo Murguia    MRN: [id number]           Patient Information     Date Of Birth          1950        Visit Information        Provider Department      10/10/2017 8:30 AM Flex Murray MD [facility] Dermatologic Surgery         Follow-ups after your visit        Your next 10 appointments already scheduled     Oct 17, 2017  1:00 PM CDT   Nurse Visit with  Derm Surg Nurse   [facility] Dermatologic Surgery ([facility])    [address] [place] [address]  3rd [facility] [address] [phone]            Sep 25, 2018 11:00 AM CDT   (Arrive by 10:45 AM)   Return Liver Transplant with Le Simmons MD   [facility] Hepatology ([facility])    [address] [place] [address] [phone]              Who to contact     Please call your clinic at [phone] to:    Ask questions about your health    Make or cancel appointments    Discuss your medicines    Learn about your test results    Speak to your doctor   If you have compliments or concerns about an experience at your clinic, or if you wish to file a complaint, please contact [facility] Patient Relations at [phone] or email us at [email or web address]         Additional Information About Your Visit        Care EveryWhere ID     This is your Care EveryWhere ID. This could be used by other organizations to access your [place] medical records  [id number]        Your Vitals Were     Pulse                   80            Blood Pressure from Last 3 Encounters:   10/10/17 (!) 154/94   10/03/17 139/81   09/26/17 160/87    Weight from Last 3 Encounters:   09/26/17 79.2 kg (174 lb 9.6 oz)   04/13/17 74.8 kg (165 lb)   09/27/16 75 kg (165 lb 6.4 oz)              Today, you had the following     No orders found for display       Primary Care Provider Office Phone # Fax #    Joaquin Haines MD  [phone]       [facility] [address] [place]  [address] [facility] [address]        Equal Access to Services     MADDIE HERNANDEZ : Hadii aad ku hadortziraul Boo, earnestana rosa goldsmithgopiha, reji liannesophie schulz, felix gurvinderin hayaan gautamfelice espinoza laAbhinavhardeep locke. So [facility] [phone].    ATENCIÓN: Si habla español, tiene a saleh disposición servicios gratuitos de asistencia lingüística. Llame al [phone].    We comply with applicable federal civil rights laws and [place] laws. We do not discriminate on the basis of race, color, national origin, age, disability, sex, sexual orientation, or gender identity.            Thank you!     Thank you for choosing [facility] DERMATOLOGIC SURGERY  for your care. Our goal is always to provide you with excellent care. Hearing back from our patients is one way we can continue to improve our services. Please take a few minutes to complete the written survey that you may receive in the mail after your visit with us. Thank you!             Your Updated Medication List - Protect others around you: Learn how to safely use, store and throw away your medicines at www.disposemymeds.org.          This list is accurate as of: 10/10/17  9:03 AM.  Always use your most recent med list.                   Brand Name Dispense Instructions for use Diagnosis    amLODIPine 5 MG tablet    NORVASC     Take 5 mg by mouth daily.        ASPIRIN PO      Take 325 mg by mouth daily        fluticasone 110 MCG/ACT Inhaler    FLOVENT HFA     Inhale 2 puffs into the lungs 2 times daily    Liver replaced by transplant (H)       metoprolol 25 MG tablet    LOPRESSOR     Take 25 mg by mouth daily        MULTIVITAMIN PO      Take 1 tablet by mouth daily        * tacrolimus 0.5 MG 24 hr capsule    ASTAGRAF XL          * tacrolimus 0.5 MG capsule    GENERIC EQUIVALENT    180 capsule    Take 3 capsules (1.5 mg) by mouth every 12 hours    Liver replaced by transplant (H)       ursodiol 300 MG capsule    ACTIGALL     60 capsule    TAKE ONE CAPSULE BY MOUTH TWICE A DAY    Liver replaced by transplant (H)       VENTOLIN  (90 BASE) MCG/ACT Inhaler   Generic drug:  albuterol      Inhale 2 puffs into the lungs every 6 hours.        * Notice:  This list has 2 medication(s) that are the same as other medications prescribed for you. Read the directions carefully, and ask your doctor or other care provider to review them with you.

## 2017-10-10 NOTE — LETTER
10/10/2017       RE: Lupillo Murguia  [address]  [place] [address]     Dear Colleague,    Thank you for referring your patient, Lupillo Murguia, to the [facility] DERMATOLOGIC SURGERY at [facility]. Please see a copy of my visit note below.                          [facility] Dermatology Note      Dermatology Problem List:  1. Basal Cell Carcinoma Scalp, Right Vertex - treated with Mohs 10/03/17  2. Basal Cell Carcinoma Right Jawline - Mohs Scheduled Today    CC:   Chief Complaint   Patient presents with     Skin Cancer               Encounter Date: Oct 10, 2017    History of Present Illness:  Mr. Lupillo Murguia is a 67 year old male presenting to clinic. He believed this visit was for suture removal, not a second Mohs procedure. He is disappointed that such a large piece was excised at last week's surgery. He expected the surgery to be performed with multiple small layers instead. He feels that smaller layers may have cured it, but he was not given that chance. Aside from the size and extent of the surgery last week, he denies any wound healing complications. His scalp is a little painful, but it is tolerable. He denies excessive bleeding.     He does not want to have surgery on the BCC on his right jawline today.     Past Medical History:   Patient Active Problem List   Diagnosis     Liver replaced by transplant (H)     BCC (basal cell carcinoma)     Bladder cancer (H)     Skin exam, screening for cancer     Past Medical History:   Diagnosis Date     BCC (basal cell carcinoma)      Liver transplanted (H)      Unspecified cerebral artery occlusion with cerebral infarction October 2013     Past Surgical History:   Procedure Laterality Date     COLONOSCOPY N/A 1/12/2017    Procedure: COMBINED COLONOSCOPY, SINGLE OR MULTIPLE BIOPSY/POLYPECTOMY BY BIOPSY;  Surgeon: Le Simmons MD;  Location: [facility]     MOHS MICROGRAPHIC PROCEDURE  3/7/13    right  nasal ala, left upper back, right temple     TRANSPLANT         Medications:  Current Outpatient Prescriptions   Medication Sig Dispense Refill     tacrolimus (ASTAGRAF XL) 0.5 MG 24 hr capsule        Multiple Vitamins-Minerals (MULTIVITAMIN PO) Take 1 tablet by mouth daily       tacrolimus (GENERIC EQUIVALENT) 0.5 MG capsule Take 3 capsules (1.5 mg) by mouth every 12 hours 180 capsule 11     ursodiol (ACTIGALL) 300 MG capsule TAKE ONE CAPSULE BY MOUTH TWICE A DAY 60 capsule 11     fluticasone (FLOVENT HFA) 110 MCG/ACT inhaler Inhale 2 puffs into the lungs 2 times daily       ASPIRIN PO Take 325 mg by mouth daily       amLODIPine (NORVASC) 5 MG tablet Take 5 mg by mouth daily.       metoprolol (LOPRESSOR) 25 MG tablet Take 25 mg by mouth daily        Albuterol Sulfate (VENTOLIN HFA) 108 (90 BASE) MCG/ACT AERS Inhale 2 puffs into the lungs every 6 hours.       Allergies   Allergen Reactions     Levaquin [Levofloxacin] Shortness Of Breath         Review of Systems:  -Constitutional: The patient denies fatigue, fevers, chills, unintended weight loss, and night sweats.  -Skin: As above in HPI. No additional skin concerns.    Physical exam:  Vitals: BP (!) 154/94 (BP Location: Left arm, Patient Position: Chair, Cuff Size: Adult Regular)  Pulse 80  GEN: This is a well developed, well-nourished male in no acute distress, in a pleasant mood.    SKIN: Focused examination of the Scalp and face was performed.  - Rotation Flap wound is clean and intact. Some hemorrhagic crust along the repair line.    Non-tender to palpation. Minimal redness at the repair edges.   - Right jawline - pink pearly papule with central depression   -No other lesions of concern on areas examined.     Impression/Plan:  1. Basal Cell Carcinoma - right vertex scalp    Wound appears to be healing well. Flap is well perfused. No clinical signs of infection.     Patient did not present for a consult visit prior to surgery. Though we discussed the nature,  risks, and benefits of surgery prior to the procedure last week, it seems that his expectations were that smaller layers should have taken. I explained that our goal is excise tissue to clear the skin cancer with each layer. I explained the purpose of curetting the wound is to get a better clinical sense of the size and depth of the skin cancer. The first layer can be more accurately estimated after curettage and is taken in healthy tissue adjacent to the ulcer. I do not make a practice of excising shallow layers on purpose.     Suture removal should occur in 1 week (a total of 2 weeks).     2. Basal Cell Carcinoma, right jawline    Owing to the extent of the first surgery, the patient would like to have a second opinion or possibly not have the BCC on his jawline treated. He does not want surgery today.     At his initial visit, he stated that he could no longer see the site biopsied on his jawline. He inquired about not having the surgery.     At that time, my medical opinion was that the skin cancer is still present on his right jawline. The path report also confirmed the incomplete removal by the biopsy. My advice to him was to have the BCC treated while the lesion is small. Delaying surgery will allow the skin cancer to continue growing and lead to a more invasive surgery.     It appears the patient is minimizing the risk and outcomes of these skin cancers to rationalize delaying treatment.     My medical advice remains to have the skin cancer treated.     He would like to pursue a second opinion. I offered him a referral to Dr. Mackenzie or Dr. Michelle (a surgeon who's work he was previously satisfied with), but he declined.     He scheduled his suture removal appointment for one week.     CC Dr. Barahona on close of this encounter.    Staff Involved:  Staff Only    Flex Murray DO    Department of Dermatology  [facility]: Phone:  [phone], Fax:[phone]  [facility]: Phone: [phone], Fax: [phone]

## 2017-10-17 ENCOUNTER — ALLIED HEALTH/NURSE VISIT (OUTPATIENT)
Dept: DERMATOLOGY | Facility: CLINIC | Age: 67
End: 2017-10-17

## 2017-10-17 DIAGNOSIS — C44.41 BASAL CELL CARCINOMA OF SKIN OF SCALP AND NECK: Primary | ICD-10-CM

## 2017-10-17 NOTE — PATIENT INSTRUCTIONS
Wound care instructions for Superficial Wounds        1. Remove Dressing    2.   Clean and dry the area with tap water using a Q-tip or sterile gauze pad    3.   Apply vaseline ointment over entire wound. Do NOT use neosporin ointment.    4.   Cover the wound with a band-aid or a sterile non-stick gauze pad and micropore paper tape.    Repeat these instructions at least once a day until the wound has completely healed.    No dietary restrictions.    Continue normal activity    The wound will not heal better when exposed to air and allowed to dry out. The wound will heal faster with a better cosmetic result if it is kept moist with ointment and covered with a bandage.  Do not let the wound dry out.    What to expect:    All wounds develop a small ring of redness surrounding the wound that indicate healing. Severe itching with extensive redness usually indicates sensitivity to the ointment or bandage tape used to dress the wound. You should call the nurse triage line if this occurs.    It is normal for there to be bruising or swelling around your surgical site, especially when it is near, or on, the eyelid.    If your wound is draining, this is normal. Larger wounds tend to drain more than smaller wounds. Please call if the draining is extensive or if there is yellow/green discharge. After about a week, the wound size should shrink. The wound is healed when you can see skin has formed over the entire area. A healed wound has a healthy shiny appearance and is red/dark pink in color. Wounds may take four to six weeks to heal. Larger wounds generally take a few weeks longer to heal than smaller wounds. Once the wound is healed, you may stop dressing changes.    There may be a tightness as the wound heals, but this is normal and will gradually decrease and disappear.    Your wound may be sensitive to temperature changes after it is healed. Avoid extreme temperature changes if you are having discomfort, but this will  improve with time.    If the wound seems to itch once it is healed, you may use vaseline to help relieve the itching.        Repeat the instructions above until wound is completely healed    If you notice that the scar is red and firm (after you remove the dressing) this is normal and will fade over time. It may take up to 6-12 months for this to occur.  (Start massage Nov 20th 2017)  Massaging the area helps the scar fade and soften quicker. Begin to massage the area one month after the dressings have been removed. Apply pressure directly and firmly over the scar with the fingertips and move in circular motions. You may massage up to 10 times daily, each time for 30 seconds.    It is normal for there to be a tender, pimple-like bump along the scar about 6-8 weeks after surgery. This sometimes happens as the scar matures and the stitches beneath begin to dissolve. Do not pick or squeeze. It will resolve in time. If an area of the wound does open and there appears to be drainage, you may apply one of the ointments listed in the above directions a few times every day until the wound is completely healed.    Numbness around the surgical site is normal. It can take up to 12-18 months for the feeling to return to normal. Itchiness, tingling, and occasional sharp pains might be present during this portion of the healing. All of these feelings will subside once the nerves have completely healed.      Phone numbers:  During business hours (M-F 8:00-4:30 p.m.)  [facility]-  [phone] Option 1 appt. desk  [phone]  Option 3 nurse triage line  ---------------------------------------------------------  Evenings/Weekends/Holidays  Hospital - [phone]   TTY for hearing lbmaomcd-[phone]  *Ask  to page dermatologist on-call  Emergency Nqvd-[phone]  TTY for hearing impaired- [phone]    Once you are ready to schedule your next appointment for Mohs surgery please  contact  Maggie Marin  [phone]

## 2017-10-17 NOTE — NURSING NOTE
Chief Complaint   Patient presents with     Suture Removal     Suture removal post Mohs      CHIEF COMPLAINT: Basal Cell Carcinoma  Lupillo Murguia is a 67 year old male who is here for suture removal following Mohs excision of a Basal Cell Carcinoma of the Vertex scalp. The wound appears to be healing well and sutures were removed at this time.   Eliane Krishnan RN       Photo taken

## 2017-10-17 NOTE — NURSING NOTE
Vaseline and telfa applied.    Aftercare reviewed all questions answered.     Eliane Krishnan RN

## 2017-10-17 NOTE — MR AVS SNAPSHOT
After Visit Summary   10/17/2017    Lupillo Murguia    MRN: [id number]           Patient Information     Date Of Birth          1950        Visit Information        Provider Department      10/17/2017 1:00 PM Nurse, Kleber Dupree [facility] Dermatologic Surgery        Care Instructions        Wound care instructions for Superficial Wounds        1. Remove Dressing    2.   Clean and dry the area with tap water using a Q-tip or sterile gauze pad    3.   Apply vaseline ointment over entire wound. Do NOT use neosporin ointment.    4.   Cover the wound with a band-aid or a sterile non-stick gauze pad and micropore paper tape.    Repeat these instructions at least once a day until the wound has completely healed.    No dietary restrictions.    Continue normal activity    The wound will not heal better when exposed to air and allowed to dry out. The wound will heal faster with a better cosmetic result if it is kept moist with ointment and covered with a bandage.  Do not let the wound dry out.    What to expect:    All wounds develop a small ring of redness surrounding the wound that indicate healing. Severe itching with extensive redness usually indicates sensitivity to the ointment or bandage tape used to dress the wound. You should call the nurse triage line if this occurs.    It is normal for there to be bruising or swelling around your surgical site, especially when it is near, or on, the eyelid.    If your wound is draining, this is normal. Larger wounds tend to drain more than smaller wounds. Please call if the draining is extensive or if there is yellow/green discharge. After about a week, the wound size should shrink. The wound is healed when you can see skin has formed over the entire area. A healed wound has a healthy shiny appearance and is red/dark pink in color. Wounds may take four to six weeks to heal. Larger wounds generally take a few weeks longer to heal than smaller wounds. Once the wound  is healed, you may stop dressing changes.    There may be a tightness as the wound heals, but this is normal and will gradually decrease and disappear.    Your wound may be sensitive to temperature changes after it is healed. Avoid extreme temperature changes if you are having discomfort, but this will improve with time.    If the wound seems to itch once it is healed, you may use vaseline to help relieve the itching.        Repeat the instructions above until wound is completely healed    If you notice that the scar is red and firm (after you remove the dressing) this is normal and will fade over time. It may take up to 6-12 months for this to occur.  (Start massage Nov 20th 2017)  Massaging the area helps the scar fade and soften quicker. Begin to massage the area one month after the dressings have been removed. Apply pressure directly and firmly over the scar with the fingertips and move in circular motions. You may massage up to 10 times daily, each time for 30 seconds.    It is normal for there to be a tender, pimple-like bump along the scar about 6-8 weeks after surgery. This sometimes happens as the scar matures and the stitches beneath begin to dissolve. Do not pick or squeeze. It will resolve in time. If an area of the wound does open and there appears to be drainage, you may apply one of the ointments listed in the above directions a few times every day until the wound is completely healed.    Numbness around the surgical site is normal. It can take up to 12-18 months for the feeling to return to normal. Itchiness, tingling, and occasional sharp pains might be present during this portion of the healing. All of these feelings will subside once the nerves have completely healed.      Phone numbers:  During business hours (M-F 8:00-4:30 p.m.)  [facility]-  [phone] Option 1 appt. desk  [phone]  Option 3 nurse triage  line  ---------------------------------------------------------  Evenings/Weekends/Holidays  Hospital - [phone]   TTY for hearing mnlcjqzm-[phone]  *Ask  to page dermatologist on-call  Emergency Bula-[phone]  TTY for hearing impaired- [phone]    Once you are ready to schedule your next appointment for Mohs surgery please contact  Maggie Marin  [phone]                 Follow-ups after your visit        Your next 10 appointments already scheduled     Sep 25, 2018 11:00 AM CDT   (Arrive by 10:45 AM)   Return Liver Transplant with Le Simmons MD   [facility] Hepatology ([facility])    [address] [place] [address] [phone]              Who to contact     Please call your clinic at [phone] to:    Ask questions about your health    Make or cancel appointments    Discuss your medicines    Learn about your test results    Speak to your doctor   If you have compliments or concerns about an experience at your clinic, or if you wish to file a complaint, please contact [facility] Patient Relations at [phone] or email us at [email or web address]         Additional Information About Your Visit        Care EveryWhere ID     This is your Care EveryWhere ID. This could be used by other organizations to access your [place] medical records  [id number]         Blood Pressure from Last 3 Encounters:   10/10/17 (!) 154/94   10/03/17 139/81   09/26/17 160/87    Weight from Last 3 Encounters:   09/26/17 79.2 kg (174 lb 9.6 oz)   04/13/17 74.8 kg (165 lb)   09/27/16 75 kg (165 lb 6.4 oz)              Today, you had the following     No orders found for display       Primary Care Provider Office Phone # Fax #    Joaquin Haines [phone]       [facility] [address] [place] [address]        Equal Access to Services     MADDIE HERNANDEZ  AH: Hadii cholo barryortizraul Ema, waaxda luqadaha, qaybta kasophie schulz, felix gurvinderin hayaamanuel florezfelice espinoza dale locke. So [facility] [phone].    ATENCIÓN: Si habla stephany, tiene a saleh disposición servicios gratuitos de asistencia lingüística. Llame al [phone].    We comply with applicable federal civil rights laws and [place] laws. We do not discriminate on the basis of race, color, national origin, age, disability, sex, sexual orientation, or gender identity.            Thank you!     Thank you for choosing [facility] DERMATOLOGIC SURGERY  for your care. Our goal is always to provide you with excellent care. Hearing back from our patients is one way we can continue to improve our services. Please take a few minutes to complete the written survey that you may receive in the mail after your visit with us. Thank you!             Your Updated Medication List - Protect others around you: Learn how to safely use, store and throw away your medicines at www.disposemymeds.org.          This list is accurate as of: 10/17/17  1:36 PM.  Always use your most recent med list.                   Brand Name Dispense Instructions for use Diagnosis    amLODIPine 5 MG tablet    NORVASC     Take 5 mg by mouth daily.        ASPIRIN PO      Take 325 mg by mouth daily        fluticasone 110 MCG/ACT Inhaler    FLOVENT HFA     Inhale 2 puffs into the lungs 2 times daily    Liver replaced by transplant (H)       metoprolol 25 MG tablet    LOPRESSOR     Take 25 mg by mouth daily        MULTIVITAMIN PO      Take 1 tablet by mouth daily        * tacrolimus 0.5 MG 24 hr capsule    ASTAGRAF XL          * tacrolimus 0.5 MG capsule    GENERIC EQUIVALENT    180 capsule    Take 3 capsules (1.5 mg) by mouth every 12 hours    Liver replaced by transplant (H)       ursodiol 300 MG capsule    ACTIGALL    60 capsule    TAKE ONE CAPSULE BY MOUTH TWICE A DAY    Liver replaced by transplant (H)       VENTOLIN  (90 BASE) MCG/ACT Inhaler    Generic drug:  albuterol      Inhale 2 puffs into the lungs every 6 hours.        * Notice:  This list has 2 medication(s) that are the same as other medications prescribed for you. Read the directions carefully, and ask your doctor or other care provider to review them with you.

## 2017-12-15 ENCOUNTER — TELEPHONE (OUTPATIENT)
Dept: TRANSPLANT | Facility: CLINIC | Age: 67
End: 2017-12-15

## 2017-12-15 DIAGNOSIS — Z94.4 LIVER REPLACED BY TRANSPLANT (H): ICD-10-CM

## 2017-12-15 LAB
ALBUMIN SERPL-MCNC: 3.6 G/DL (ref 3.4–5)
ALBUMIN UR-MCNC: NEGATIVE MG/DL
ALP SERPL-CCNC: 64 U/L (ref 40–150)
ALT SERPL W P-5'-P-CCNC: 22 U/L (ref 0–70)
ANION GAP SERPL CALCULATED.3IONS-SCNC: 7 MMOL/L (ref 3–14)
APPEARANCE UR: CLEAR
AST SERPL W P-5'-P-CCNC: 17 U/L (ref 0–45)
BILIRUB DIRECT SERPL-MCNC: <0.1 MG/DL (ref 0–0.2)
BILIRUB SERPL-MCNC: 0.3 MG/DL (ref 0.2–1.3)
BILIRUB UR QL STRIP: NEGATIVE
BUN SERPL-MCNC: 23 MG/DL (ref 7–30)
CALCIUM SERPL-MCNC: 8.2 MG/DL (ref 8.5–10.1)
CHLORIDE SERPL-SCNC: 108 MMOL/L (ref 94–109)
CHOLEST SERPL-MCNC: 138 MG/DL
CO2 SERPL-SCNC: 25 MMOL/L (ref 20–32)
COLOR UR AUTO: YELLOW
CREAT SERPL-MCNC: 1.1 MG/DL (ref 0.66–1.25)
CREAT UR-MCNC: 75 MG/DL
ERYTHROCYTE [DISTWIDTH] IN BLOOD BY AUTOMATED COUNT: 15.5 % (ref 10–15)
GFR SERPL CREATININE-BSD FRML MDRD: 67 ML/MIN/1.7M2
GLUCOSE SERPL-MCNC: 97 MG/DL (ref 70–99)
GLUCOSE UR STRIP-MCNC: NEGATIVE MG/DL
HCT VFR BLD AUTO: 44.8 % (ref 40–53)
HDLC SERPL-MCNC: 36 MG/DL
HGB BLD-MCNC: 13.5 G/DL (ref 13.3–17.7)
HGB UR QL STRIP: NEGATIVE
KETONES UR STRIP-MCNC: NEGATIVE MG/DL
LDLC SERPL CALC-MCNC: 56 MG/DL
LEUKOCYTE ESTERASE UR QL STRIP: NEGATIVE
MCH RBC QN AUTO: 27.2 PG (ref 26.5–33)
MCHC RBC AUTO-ENTMCNC: 30.1 G/DL (ref 31.5–36.5)
MCV RBC AUTO: 90 FL (ref 78–100)
MUCOUS THREADS #/AREA URNS LPF: PRESENT /LPF
NITRATE UR QL: NEGATIVE
NONHDLC SERPL-MCNC: 102 MG/DL
PH UR STRIP: 6 PH (ref 5–7)
PLATELET # BLD AUTO: 199 10E9/L (ref 150–450)
POTASSIUM SERPL-SCNC: 4 MMOL/L (ref 3.4–5.3)
PROT SERPL-MCNC: 7.4 G/DL (ref 6.8–8.8)
PROT UR-MCNC: 0.18 G/L
PROT/CREAT 24H UR: 0.24 G/G CR (ref 0–0.2)
RBC # BLD AUTO: 4.97 10E12/L (ref 4.4–5.9)
RBC #/AREA URNS AUTO: 2 /HPF (ref 0–2)
SODIUM SERPL-SCNC: 139 MMOL/L (ref 133–144)
SOURCE: ABNORMAL
SP GR UR STRIP: 1.01 (ref 1–1.03)
TACROLIMUS BLD-MCNC: 6.1 UG/L (ref 5–15)
TME LAST DOSE: NORMAL H
TRIGL SERPL-MCNC: 225 MG/DL
UROBILINOGEN UR STRIP-MCNC: 0 MG/DL (ref 0–2)
WBC # BLD AUTO: 7.9 10E9/L (ref 4–11)
WBC #/AREA URNS AUTO: 1 /HPF (ref 0–2)

## 2017-12-15 PROCEDURE — 80197 ASSAY OF TACROLIMUS: CPT | Performed by: INTERNAL MEDICINE

## 2017-12-15 NOTE — TELEPHONE ENCOUNTER
Please call Lupillo about labs done on 12/15, a lipid panel was done, cholesterol and triglycerides.   The triglycerides were high, 225, please ask Lupillo if he was fasting from midnight before having labs done ?  If not, the triglycerides are falsely elevated.  He does not need to repeat this lab as he had it done in July and it was normal.  Please let him know the rest of his labs were very good.

## 2017-12-29 NOTE — TELEPHONE ENCOUNTER
Confirmed pt was not fasting for labs on 12/15. Let him know this doesn't need to be rechecked until July.

## 2018-01-16 ENCOUNTER — TELEPHONE (OUTPATIENT)
Dept: DERMATOLOGY | Facility: CLINIC | Age: 68
End: 2018-01-16

## 2018-01-16 NOTE — TELEPHONE ENCOUNTER
I called the patient about scheduling for the second site. The patient voiced that he was not happy with his first MOHS here and that he is working on being seen somewhere else. I asked that the patient call us and let us know when and where he gets this done. The patient agreed.   Enma Morataya, [facility]

## 2018-05-10 DIAGNOSIS — Z94.4 LIVER REPLACED BY TRANSPLANT (H): ICD-10-CM

## 2018-05-10 LAB
ALBUMIN SERPL-MCNC: 3.8 G/DL (ref 3.4–5)
ALP SERPL-CCNC: 58 U/L (ref 40–150)
ALT SERPL W P-5'-P-CCNC: 19 U/L (ref 0–70)
ANION GAP SERPL CALCULATED.3IONS-SCNC: 8 MMOL/L (ref 3–14)
AST SERPL W P-5'-P-CCNC: 15 U/L (ref 0–45)
BILIRUB DIRECT SERPL-MCNC: 0.2 MG/DL (ref 0–0.2)
BILIRUB SERPL-MCNC: 0.6 MG/DL (ref 0.2–1.3)
BUN SERPL-MCNC: 16 MG/DL (ref 7–30)
CALCIUM SERPL-MCNC: 8.6 MG/DL (ref 8.5–10.1)
CHLORIDE SERPL-SCNC: 106 MMOL/L (ref 94–109)
CO2 SERPL-SCNC: 25 MMOL/L (ref 20–32)
CREAT SERPL-MCNC: 1.06 MG/DL (ref 0.66–1.25)
ERYTHROCYTE [DISTWIDTH] IN BLOOD BY AUTOMATED COUNT: 15.2 % (ref 10–15)
GFR SERPL CREATININE-BSD FRML MDRD: 70 ML/MIN/1.7M2
GLUCOSE SERPL-MCNC: 78 MG/DL (ref 70–99)
HCT VFR BLD AUTO: 45 % (ref 40–53)
HGB BLD-MCNC: 14.2 G/DL (ref 13.3–17.7)
MCH RBC QN AUTO: 28.1 PG (ref 26.5–33)
MCHC RBC AUTO-ENTMCNC: 31.6 G/DL (ref 31.5–36.5)
MCV RBC AUTO: 89 FL (ref 78–100)
PLATELET # BLD AUTO: 197 10E9/L (ref 150–450)
POTASSIUM SERPL-SCNC: 4.1 MMOL/L (ref 3.4–5.3)
PROT SERPL-MCNC: 7.8 G/DL (ref 6.8–8.8)
RBC # BLD AUTO: 5.06 10E12/L (ref 4.4–5.9)
SODIUM SERPL-SCNC: 138 MMOL/L (ref 133–144)
TACROLIMUS BLD-MCNC: 6.1 UG/L (ref 5–15)
TME LAST DOSE: NORMAL H
WBC # BLD AUTO: 8.3 10E9/L (ref 4–11)

## 2018-05-10 PROCEDURE — 80197 ASSAY OF TACROLIMUS: CPT | Performed by: INTERNAL MEDICINE

## 2018-08-07 DIAGNOSIS — Z94.4 LIVER REPLACED BY TRANSPLANT (H): ICD-10-CM

## 2018-08-07 NOTE — TELEPHONE ENCOUNTER
Tacrolimus 0.5  Last FIlled Date 7/12/18  Qty dispensed 180  Thank you very kindly!  Candida Arredondo [facility]

## 2018-08-08 RX ORDER — TACROLIMUS 0.5 MG/1
1.5 CAPSULE ORAL EVERY 12 HOURS
Qty: 180 CAPSULE | Refills: 11 | Status: SHIPPED | OUTPATIENT
Start: 2018-08-08 | End: 2019-01-07

## 2018-08-24 DIAGNOSIS — Z94.4 LIVER REPLACED BY TRANSPLANT (H): ICD-10-CM

## 2018-08-24 LAB
ALBUMIN SERPL-MCNC: 3.7 G/DL (ref 3.4–5)
ALP SERPL-CCNC: 65 U/L (ref 40–150)
ALT SERPL W P-5'-P-CCNC: 19 U/L (ref 0–70)
ANION GAP SERPL CALCULATED.3IONS-SCNC: 6 MMOL/L (ref 3–14)
AST SERPL W P-5'-P-CCNC: 17 U/L (ref 0–45)
BILIRUB DIRECT SERPL-MCNC: 0.2 MG/DL (ref 0–0.2)
BILIRUB SERPL-MCNC: 0.6 MG/DL (ref 0.2–1.3)
BUN SERPL-MCNC: 14 MG/DL (ref 7–30)
CALCIUM SERPL-MCNC: 8.5 MG/DL (ref 8.5–10.1)
CHLORIDE SERPL-SCNC: 104 MMOL/L (ref 94–109)
CO2 SERPL-SCNC: 26 MMOL/L (ref 20–32)
CREAT SERPL-MCNC: 1 MG/DL (ref 0.66–1.25)
ERYTHROCYTE [DISTWIDTH] IN BLOOD BY AUTOMATED COUNT: 14.5 % (ref 10–15)
GFR SERPL CREATININE-BSD FRML MDRD: 75 ML/MIN/1.7M2
GLUCOSE SERPL-MCNC: 86 MG/DL (ref 70–99)
HCT VFR BLD AUTO: 47 % (ref 40–53)
HGB BLD-MCNC: 15.6 G/DL (ref 13.3–17.7)
MCH RBC QN AUTO: 30.6 PG (ref 26.5–33)
MCHC RBC AUTO-ENTMCNC: 33.2 G/DL (ref 31.5–36.5)
MCV RBC AUTO: 92 FL (ref 78–100)
PLATELET # BLD AUTO: 183 10E9/L (ref 150–450)
POTASSIUM SERPL-SCNC: 3.8 MMOL/L (ref 3.4–5.3)
PROT SERPL-MCNC: 7.6 G/DL (ref 6.8–8.8)
RBC # BLD AUTO: 5.09 10E12/L (ref 4.4–5.9)
SODIUM SERPL-SCNC: 136 MMOL/L (ref 133–144)
TACROLIMUS BLD-MCNC: 6.4 UG/L (ref 5–15)
TME LAST DOSE: NORMAL H
WBC # BLD AUTO: 9.1 10E9/L (ref 4–11)

## 2018-08-24 PROCEDURE — 80197 ASSAY OF TACROLIMUS: CPT | Performed by: INTERNAL MEDICINE

## 2018-09-17 DIAGNOSIS — Z94.4 LIVER REPLACED BY TRANSPLANT (H): Primary | ICD-10-CM

## 2018-09-25 ENCOUNTER — OFFICE VISIT (OUTPATIENT)
Dept: GASTROENTEROLOGY | Facility: CLINIC | Age: 68
End: 2018-09-25
Attending: INTERNAL MEDICINE
Payer: MEDICARE

## 2018-09-25 VITALS
WEIGHT: 181.8 LBS | DIASTOLIC BLOOD PRESSURE: 80 MMHG | OXYGEN SATURATION: 95 % | SYSTOLIC BLOOD PRESSURE: 164 MMHG | HEIGHT: 71 IN | HEART RATE: 76 BPM | BODY MASS INDEX: 25.45 KG/M2 | TEMPERATURE: 98 F

## 2018-09-25 DIAGNOSIS — Z94.4 LIVER REPLACED BY TRANSPLANT (H): Primary | ICD-10-CM

## 2018-09-25 DIAGNOSIS — C44.41 BASAL CELL CARCINOMA OF SCALP: ICD-10-CM

## 2018-09-25 PROCEDURE — G0463 HOSPITAL OUTPT CLINIC VISIT: HCPCS | Mod: ZF

## 2018-09-25 ASSESSMENT — PAIN SCALES - GENERAL: PAINLEVEL: NO PAIN (0)

## 2018-09-25 NOTE — LETTER
9/25/2018       RE: Lupillo Murguia  [address] [place] [address]     Dear Colleague,    Thank you for referring your patient, Lupillo Murguia, to the [facility] HEPATOLOGY at [facility]. Please see a copy of my visit note below.    [facility]    Hepatology follow-up    CHIEF COMPLAINT/REASON FOR VISIT:  Status post liver transplantation.       SUBJECTIVE:  Mr. Murguia is a 68-year-old  male who did have a liver transplantation on 10/30/2010.  He at that time had decompensated liver disease of unclear etiology.  Regardless, after the liver transplantation, he did recuperate very well and did well regarding the transplantation, as his liver function tests have remained normal.  Mr. Murguia' main problem was he had significant osteoarthritis, and in fact, he did get bilateral hip replacement and right knee replacement, also, and still limps.  As far as his other complaints are concerned, he is doing quite well.  The only issue since we last saw him was that he had the flu, although he got the flu shots last year.  He did gain a lot of weight, like 50 pounds since he got the transplantation, and he had basal cell cancer of the scalp removed, and he is due for a revisit to Dermatology.  His appetite is good.  No abdominal pain, nausea or vomiting.  He is having good bowel movements.  He had a colonoscopy on 01/12/2017.     Medical hx Surgical hx   Past Medical History:   Diagnosis Date     BCC (basal cell carcinoma)      Liver transplanted (H)      Unspecified cerebral artery occlusion with cerebral infarction October 2013      Past Surgical History:   Procedure Laterality Date     COLONOSCOPY N/A 1/12/2017    Procedure: COMBINED COLONOSCOPY, SINGLE OR MULTIPLE BIOPSY/POLYPECTOMY BY BIOPSY;  Surgeon: Le Simmons MD;  Location:  GI     MOHS MICROGRAPHIC PROCEDURE  3/7/13    right nasal ala, left upper back, right temple      "TRANSPLANT            Medications  Prior to Admission medications    Medication Sig Start Date End Date Taking? Authorizing Provider   Albuterol Sulfate (VENTOLIN HFA) 108 (90 BASE) MCG/ACT AERS Inhale 2 puffs into the lungs every 6 hours.   Yes Reported, Patient   amLODIPine (NORVASC) 5 MG tablet Take 5 mg by mouth daily.   Yes Reported, Patient   ASPIRIN PO Take 325 mg by mouth daily   Yes Reported, Patient   fluticasone (FLOVENT HFA) 110 MCG/ACT inhaler Inhale 2 puffs into the lungs 2 times daily   Yes Reported, Patient   metoprolol (LOPRESSOR) 25 MG tablet Take 25 mg by mouth daily    Yes Reported, Patient   tacrolimus (GENERIC EQUIVALENT) 0.5 MG capsule Take 3 capsules (1.5 mg) by mouth every 12 hours 8/8/18  Yes Le Simmons MD   ursodiol (ACTIGALL) 300 MG capsule TAKE ONE CAPSULE BY MOUTH TWICE A DAY 10/10/16  Yes Le Simmons MD       Allergies  Allergies   Allergen Reactions     Levaquin [Levofloxacin] Shortness Of Breath       Review of systems  A 10-point review of systems was negative    Examination  /80  Pulse 76  Temp 98  F (36.7  C) (Oral)  Ht 1.803 m (5' 10.98\")  Wt 82.5 kg (181 lb 12.8 oz)  SpO2 95%  BMI 25.37 kg/m2  Body mass index is 25.37 kg/(m^2).    Gen- well, NAD, A+Ox3, normal color  Lym- no palpable LAD  CVS- RRR  RS- CTA  Abd- Healed surgical scars. Not tender.  Extr- hands normal, no VANGIE  Skin- no rash or jaundice  Neuro- no asterixis  Psych- normal mood    Laboratory  Lab Results   Component Value Date     08/24/2018    POTASSIUM 3.8 08/24/2018    CHLORIDE 104 08/24/2018    CO2 26 08/24/2018    BUN 14 08/24/2018    CR 1.00 08/24/2018       Lab Results   Component Value Date    BILITOTAL 0.6 08/24/2018    ALT 19 08/24/2018    AST 17 08/24/2018    ALKPHOS 65 08/24/2018       Lab Results   Component Value Date    ALBUMIN 3.7 08/24/2018    PROTTOTAL 7.6 08/24/2018        Lab Results   Component Value Date    WBC 9.1 08/24/2018    HGB 15.6 " 08/24/2018    MCV 92 08/24/2018     08/24/2018       Lab Results   Component Value Date    INR 0.99 09/27/2012       Radiology    ASSESSMENT AND PLAN:  Mr. Murguia is a 68-year-old  male whom we have seen initially for decompensated cirrhosis of unknown etiology.  He did get a liver transplantation on 10/30/2010 and recuperated very well since.  His main problem was osteoarthritis, and he did require bilateral hip replacement and right knee replacement, also.  He did have, also, a very large basal cell carcinoma of the scalp, which was surgically removed, and he is going to meet with them soon, so we will get him an appointment.  As far as his immunosuppression is concerned, we are not going to make any changes, and he will follow with his Primary Care physician regarding hypertension, for which he is on metoprolol and amlodipine.       This was a 25 minute visit, of which more than 50% was spent in explaining to the patient what our plan of care was.  We answered all his questions.     Le Simmons MD  Hepatology  [facility]    cc:   Joauqin Haines MD   [facility]    [address] [place] [address]

## 2018-09-25 NOTE — LETTER
9/25/2018      RE: Lupillo Murguia  [address] [place] [address]       [facility]    Hepatology follow-up    CHIEF COMPLAINT/REASON FOR VISIT:  Status post liver transplantation.       SUBJECTIVE:  Mr. Murguia is a 68-year-old  male who did have a liver transplantation on 10/30/2010.  He at that time had decompensated liver disease of unclear etiology.  Regardless, after the liver transplantation, he did recuperate very well and did well regarding the transplantation, as his liver function tests have remained normal.  Mr. Murguia' main problem was he had significant osteoarthritis, and in fact, he did get bilateral hip replacement and right knee replacement, also, and still limps.  As far as his other complaints are concerned, he is doing quite well.  The only issue since we last saw him was that he had the flu, although he got the flu shots last year.  He did gain a lot of weight, like 50 pounds since he got the transplantation, and he had basal cell cancer of the scalp removed, and he is due for a revisit to Dermatology.  His appetite is good.  No abdominal pain, nausea or vomiting.  He is having good bowel movements.  He had a colonoscopy on 01/12/2017.     Medical hx Surgical hx   Past Medical History:   Diagnosis Date     BCC (basal cell carcinoma)      Liver transplanted (H)      Unspecified cerebral artery occlusion with cerebral infarction October 2013      Past Surgical History:   Procedure Laterality Date     COLONOSCOPY N/A 1/12/2017    Procedure: COMBINED COLONOSCOPY, SINGLE OR MULTIPLE BIOPSY/POLYPECTOMY BY BIOPSY;  Surgeon: Le Simmons MD;  Location:  GI     MOHS MICROGRAPHIC PROCEDURE  3/7/13    right nasal ala, left upper back, right temple     TRANSPLANT            Medications  Prior to Admission medications    Medication Sig Start Date End Date Taking? Authorizing Provider   Albuterol Sulfate (VENTOLIN HFA) 108 (90 BASE) MCG/ACT AERS Inhale  "2 puffs into the lungs every 6 hours.   Yes Reported, Patient   amLODIPine (NORVASC) 5 MG tablet Take 5 mg by mouth daily.   Yes Reported, Patient   ASPIRIN PO Take 325 mg by mouth daily   Yes Reported, Patient   fluticasone (FLOVENT HFA) 110 MCG/ACT inhaler Inhale 2 puffs into the lungs 2 times daily   Yes Reported, Patient   metoprolol (LOPRESSOR) 25 MG tablet Take 25 mg by mouth daily    Yes Reported, Patient   tacrolimus (GENERIC EQUIVALENT) 0.5 MG capsule Take 3 capsules (1.5 mg) by mouth every 12 hours 8/8/18  Yes Le Simmons MD   ursodiol (ACTIGALL) 300 MG capsule TAKE ONE CAPSULE BY MOUTH TWICE A DAY 10/10/16  Yes Le Simmons MD       Allergies  Allergies   Allergen Reactions     Levaquin [Levofloxacin] Shortness Of Breath       Review of systems  A 10-point review of systems was negative    Examination  /80  Pulse 76  Temp 98  F (36.7  C) (Oral)  Ht 1.803 m (5' 10.98\")  Wt 82.5 kg (181 lb 12.8 oz)  SpO2 95%  BMI 25.37 kg/m2  Body mass index is 25.37 kg/(m^2).    Gen- well, NAD, A+Ox3, normal color  Lym- no palpable LAD  CVS- RRR  RS- CTA  Abd- Healed surgical scars. Not tender.  Extr- hands normal, no VANGIE  Skin- no rash or jaundice  Neuro- no asterixis  Psych- normal mood    Laboratory  Lab Results   Component Value Date     08/24/2018    POTASSIUM 3.8 08/24/2018    CHLORIDE 104 08/24/2018    CO2 26 08/24/2018    BUN 14 08/24/2018    CR 1.00 08/24/2018       Lab Results   Component Value Date    BILITOTAL 0.6 08/24/2018    ALT 19 08/24/2018    AST 17 08/24/2018    ALKPHOS 65 08/24/2018       Lab Results   Component Value Date    ALBUMIN 3.7 08/24/2018    PROTTOTAL 7.6 08/24/2018        Lab Results   Component Value Date    WBC 9.1 08/24/2018    HGB 15.6 08/24/2018    MCV 92 08/24/2018     08/24/2018       Lab Results   Component Value Date    INR 0.99 09/27/2012       Radiology    ASSESSMENT AND PLAN:  Mr. Murguia is a 68-year-old  male " whom we have seen initially for decompensated cirrhosis of unknown etiology.  He did get a liver transplantation on 10/30/2010 and recuperated very well since.  His main problem was osteoarthritis, and he did require bilateral hip replacement and right knee replacement, also.  He did have, also, a very large basal cell carcinoma of the scalp, which was surgically removed, and he is going to meet with them soon, so we will get him an appointment.  As far as his immunosuppression is concerned, we are not going to make any changes, and he will follow with his Primary Care physician regarding hypertension, for which he is on metoprolol and amlodipine.       This was a 25 minute visit, of which more than 50% was spent in explaining to the patient what our plan of care was.  We answered all his questions.     Le Simmons MD  Hepatology  [facility]    cc:   Joaquin Haines MD   [facility]    [address] [place] [address]

## 2018-09-25 NOTE — PROGRESS NOTES
[facility]    Hepatology follow-up    CHIEF COMPLAINT/REASON FOR VISIT:  Status post liver transplantation.       SUBJECTIVE:  Mr. Murguia is a 68-year-old  male who did have a liver transplantation on 10/30/2010.  He at that time had decompensated liver disease of unclear etiology.  Regardless, after the liver transplantation, he did recuperate very well and did well regarding the transplantation, as his liver function tests have remained normal.  Mr. Murguia' main problem was he had significant osteoarthritis, and in fact, he did get bilateral hip replacement and right knee replacement, also, and still limps.  As far as his other complaints are concerned, he is doing quite well.  The only issue since we last saw him was that he had the flu, although he got the flu shots last year.  He did gain a lot of weight, like 50 pounds since he got the transplantation, and he had basal cell cancer of the scalp removed, and he is due for a revisit to Dermatology.  His appetite is good.  No abdominal pain, nausea or vomiting.  He is having good bowel movements.  He had a colonoscopy on 01/12/2017.     Medical hx Surgical hx   Past Medical History:   Diagnosis Date     BCC (basal cell carcinoma)      Liver transplanted (H)      Unspecified cerebral artery occlusion with cerebral infarction October 2013      Past Surgical History:   Procedure Laterality Date     COLONOSCOPY N/A 1/12/2017    Procedure: COMBINED COLONOSCOPY, SINGLE OR MULTIPLE BIOPSY/POLYPECTOMY BY BIOPSY;  Surgeon: Le Simmons MD;  Location:  GI     MOHS MICROGRAPHIC PROCEDURE  3/7/13    right nasal ala, left upper back, right temple     TRANSPLANT            Medications  Prior to Admission medications    Medication Sig Start Date End Date Taking? Authorizing Provider   Albuterol Sulfate (VENTOLIN HFA) 108 (90 BASE) MCG/ACT AERS Inhale 2 puffs into the lungs every 6 hours.   Yes Reported, Patient   amLODIPine  "(NORVASC) 5 MG tablet Take 5 mg by mouth daily.   Yes Reported, Patient   ASPIRIN PO Take 325 mg by mouth daily   Yes Reported, Patient   fluticasone (FLOVENT HFA) 110 MCG/ACT inhaler Inhale 2 puffs into the lungs 2 times daily   Yes Reported, Patient   metoprolol (LOPRESSOR) 25 MG tablet Take 25 mg by mouth daily    Yes Reported, Patient   tacrolimus (GENERIC EQUIVALENT) 0.5 MG capsule Take 3 capsules (1.5 mg) by mouth every 12 hours 8/8/18  Yes Le Simmons MD   ursodiol (ACTIGALL) 300 MG capsule TAKE ONE CAPSULE BY MOUTH TWICE A DAY 10/10/16  Yes Le Simmons MD       Allergies  Allergies   Allergen Reactions     Levaquin [Levofloxacin] Shortness Of Breath       Review of systems  A 10-point review of systems was negative    Examination  /80  Pulse 76  Temp 98  F (36.7  C) (Oral)  Ht 1.803 m (5' 10.98\")  Wt 82.5 kg (181 lb 12.8 oz)  SpO2 95%  BMI 25.37 kg/m2  Body mass index is 25.37 kg/(m^2).    Gen- well, NAD, A+Ox3, normal color  Lym- no palpable LAD  CVS- RRR  RS- CTA  Abd- Healed surgical scars. Not tender.  Extr- hands normal, no VANGIE  Skin- no rash or jaundice  Neuro- no asterixis  Psych- normal mood    Laboratory  Lab Results   Component Value Date     08/24/2018    POTASSIUM 3.8 08/24/2018    CHLORIDE 104 08/24/2018    CO2 26 08/24/2018    BUN 14 08/24/2018    CR 1.00 08/24/2018       Lab Results   Component Value Date    BILITOTAL 0.6 08/24/2018    ALT 19 08/24/2018    AST 17 08/24/2018    ALKPHOS 65 08/24/2018       Lab Results   Component Value Date    ALBUMIN 3.7 08/24/2018    PROTTOTAL 7.6 08/24/2018        Lab Results   Component Value Date    WBC 9.1 08/24/2018    HGB 15.6 08/24/2018    MCV 92 08/24/2018     08/24/2018       Lab Results   Component Value Date    INR 0.99 09/27/2012       Radiology    ASSESSMENT AND PLAN:  Mr. Murguia is a 68-year-old  male whom we have seen initially for decompensated cirrhosis of unknown etiology.  " He did get a liver transplantation on 10/30/2010 and recuperated very well since.  His main problem was osteoarthritis, and he did require bilateral hip replacement and right knee replacement, also.  He did have, also, a very large basal cell carcinoma of the scalp, which was surgically removed, and he is going to meet with them soon, so we will get him an appointment.  As far as his immunosuppression is concerned, we are not going to make any changes, and he will follow with his Primary Care physician regarding hypertension, for which he is on metoprolol and amlodipine.       This was a 25 minute visit, of which more than 50% was spent in explaining to the patient what our plan of care was.  We answered all his questions.      cc:   Joaquin Haines MD   [facility]    [address] [place] [address]         Le Simmons MD  Hepatology  [facility]

## 2018-09-25 NOTE — NURSING NOTE
Chief Complaint   Patient presents with     RECHECK     Post Liver TXP   Pt roomed, vitals, meds, and allergies reviewed with pt. Pt ready for provider.  Basim Ward, CMA

## 2018-09-25 NOTE — MR AVS SNAPSHOT
After Visit Summary   9/25/2018    Lupillo Murguia    MRN: [id number]           Patient Information     Date Of Birth          1950        Visit Information        Provider Department      9/25/2018 11:00 AM Le Simmons MD [facility] Hepatology        Today's Diagnoses     Liver replaced by transplant (H)    -  1    Basal cell carcinoma of scalp           Follow-ups after your visit        Additional Services     DERMATOLOGY REFERRAL                 Follow-up notes from your care team     Return in about 1 year (around 9/25/2019).      Your next 10 appointments already scheduled     Oct 05, 2018 10:30 AM CDT   (Arrive by 10:15 AM)   Return Visit with Josefina Barahona MD   [facility] Dermatology ([facility])    [address]  3rd Floor  [place] [place] [address] [phone]            Sep 24, 2019 11:00 AM CDT   (Arrive by 10:45 AM)   Return Liver Transplant with Le Simmons MD   [facility] Hepatology ([facility])    [address]  [address]  [place] [address] [phone]              Who to contact     If you have questions or need follow up information about today's clinic visit or your schedule please contact [facility] HEPATOLOGY directly at [phone].  Normal or non-critical lab and imaging results will be communicated to you by MyChart, letter or phone within 4 business days after the clinic has received the results. If you do not hear from us within 7 days, please contact the clinic through MyChart or phone. If you have a critical or abnormal lab result, we will notify you by phone as soon as possible.  Submit refill requests through [employer] or call your pharmacy and they will forward the refill request to us. Please allow 3 business days for your refill to be completed.          Additional Information About Your Visit        Care EveryWhere ID     This is your Care EveryWhere ID.  "This could be used by other organizations to access your [facility] medical records  [id number]        Your Vitals Were     Pulse Temperature Height Pulse Oximetry BMI (Body Mass Index)       76 98  F (36.7  C) (Oral) 1.803 m (5' 10.98\") 95% 25.37 kg/m2        Blood Pressure from Last 3 Encounters:   09/25/18 164/80   10/10/17 (!) 154/94   10/03/17 139/81    Weight from Last 3 Encounters:   09/25/18 82.5 kg (181 lb 12.8 oz)   09/26/17 79.2 kg (174 lb 9.6 oz)   04/13/17 74.8 kg (165 lb)              We Performed the Following     DERMATOLOGY REFERRAL        Primary Care Provider Office Phone # Fax #    Joaquin Haines [phone]       [facility] [id number]  [address] [facility] [address]        Equal Access to Services     ZOYA HERNANDEZ : Hadii cholo cedeñoo Sodev, waaxda luqadaha, qaybta kaalmada adeegyada, felix hunter . So [facility] [phone].    ATENCIÓN: Si camron hammond, tiene a saleh disposición servicios gratuitos de asistencia lingüística. Llame al [phone].    We comply with applicable federal civil rights laws and [place] laws. We do not discriminate on the basis of race, color, national origin, age, disability, sex, sexual orientation, or gender identity.            Thank you!     Thank you for choosing [facility] HEPATOLOGY  for your care. Our goal is always to provide you with excellent care. Hearing back from our patients is one way we can continue to improve our services. Please take a few minutes to complete the written survey that you may receive in the mail after your visit with us. Thank you!             Your Updated Medication List - Protect others around you: Learn how to safely use, store and throw away your medicines at www.disposemymeds.org.          This list is accurate as of 9/25/18 11:39 AM.  Always use your most recent med list.                   Brand Name Dispense Instructions for use Diagnosis    amLODIPine 5 MG tablet    " NORVASC     Take 5 mg by mouth daily.        ASPIRIN PO      Take 325 mg by mouth daily        fluticasone 110 MCG/ACT Inhaler    FLOVENT HFA     Inhale 2 puffs into the lungs 2 times daily    Liver replaced by transplant (H)       metoprolol tartrate 25 MG tablet    LOPRESSOR     Take 25 mg by mouth daily        tacrolimus 0.5 MG capsule    GENERIC EQUIVALENT    180 capsule    Take 3 capsules (1.5 mg) by mouth every 12 hours    Liver replaced by transplant (H)       ursodiol 300 MG capsule    ACTIGALL    60 capsule    TAKE ONE CAPSULE BY MOUTH TWICE A DAY    Liver replaced by transplant (H)       VENTOLIN  (90 Base) MCG/ACT inhaler   Generic drug:  albuterol      Inhale 2 puffs into the lungs every 6 hours.

## 2018-10-05 ENCOUNTER — OFFICE VISIT (OUTPATIENT)
Dept: DERMATOLOGY | Facility: CLINIC | Age: 68
End: 2018-10-05
Payer: MEDICARE

## 2018-10-05 DIAGNOSIS — Z94.4 LIVER REPLACED BY TRANSPLANT (H): ICD-10-CM

## 2018-10-05 DIAGNOSIS — Z85.828 HISTORY OF BASAL CELL CANCER: ICD-10-CM

## 2018-10-05 DIAGNOSIS — D48.5 NEOPLASM OF UNCERTAIN BEHAVIOR OF SKIN: Primary | ICD-10-CM

## 2018-10-05 DIAGNOSIS — Z12.83 SKIN EXAM, SCREENING FOR CANCER: ICD-10-CM

## 2018-10-05 PROCEDURE — 88305 TISSUE EXAM BY PATHOLOGIST: CPT | Mod: TC | Performed by: DERMATOLOGY

## 2018-10-05 RX ORDER — LIDOCAINE HYDROCHLORIDE AND EPINEPHRINE 10; 10 MG/ML; UG/ML
3 INJECTION, SOLUTION INFILTRATION; PERINEURAL ONCE
Qty: 3 ML | Refills: 0 | OUTPATIENT
Start: 2018-10-05 | End: 2018-10-05

## 2018-10-05 ASSESSMENT — PAIN SCALES - GENERAL
PAINLEVEL: NO PAIN (0)
PAINLEVEL: NO PAIN (0)

## 2018-10-05 NOTE — NURSING NOTE
Chief Complaint   Patient presents with     Skin Check     TX skin check, no particular areas of concern     Cassie Thakkar, EMT

## 2018-10-05 NOTE — PROGRESS NOTES
[facility] Dermatology Note      Dermatology Problem List:  1. Basal Cell Carcinoma Scalp, Right Vertex - treated with Mohs 10/03/17  2. Basal Cell Carcinoma Right Jawline - Patient declined Mohs at last appointment on 10/10/2017  3. Multiple other BCCs on scalp, nasal ala, back, right temple s/p removal  3. Liver transplant in 2010 on tacrolimus 1.5 mg bid    Encounter Date: Oct 5, 2018    CC:  Chief Complaint   Patient presents with     Skin Check     TX skin check, no particular areas of concern         History of Present Illness:  Mr. Lupillo Murguia is a 68 year old male who had liver transplantation currently on tacrolimus 1.5 mg bid who presents as a follow-up for skin check. He has a history of multiple BCCs. The patient was last seen 10/10/2017 when he was scheduled for Mohs of a biopsy-proven superficial and nodular BCC on his right jawline, but declined surgery as he wanted a second opinion. On discussion today, he notes that he did not receive receive second opinion.    Today, the patient notes no new/changing lesions. Wears sunscreen SPF 40+ on days he goes outside, does wear a hat when outside, practices sun avoidance.    No other skin complaints.     Past Medical History:   Patient Active Problem List   Diagnosis     Liver replaced by transplant (H)     BCC (basal cell carcinoma)     Bladder cancer (H)     Skin exam, screening for cancer     Past Medical History:   Diagnosis Date     BCC (basal cell carcinoma)      Liver transplanted (H)      Unspecified cerebral artery occlusion with cerebral infarction October 2013     Past Surgical History:   Procedure Laterality Date     COLONOSCOPY N/A 1/12/2017    Procedure: COMBINED COLONOSCOPY, SINGLE OR MULTIPLE BIOPSY/POLYPECTOMY BY BIOPSY;  Surgeon: Le Simmons MD;  Location: [facility]     MOHS MICROGRAPHIC PROCEDURE  3/7/13    right nasal ala, left upper back, right temple     TRANSPLANT         Social History:   reports  that he has quit smoking. He has never used smokeless tobacco. He reports that he does not drink alcohol or use illicit drugs.    Family History:  Family History   Problem Relation Age of Onset     Skin Cancer No family hx of      Melanoma No family hx of        Medications:  Current Outpatient Prescriptions   Medication Sig Dispense Refill     Albuterol Sulfate (VENTOLIN HFA) 108 (90 BASE) MCG/ACT AERS Inhale 2 puffs into the lungs every 6 hours.       amLODIPine (NORVASC) 5 MG tablet Take 5 mg by mouth daily.       ASPIRIN PO Take 325 mg by mouth daily       fluticasone (FLOVENT HFA) 110 MCG/ACT inhaler Inhale 2 puffs into the lungs 2 times daily       metoprolol (LOPRESSOR) 25 MG tablet Take 25 mg by mouth daily        tacrolimus (GENERIC EQUIVALENT) 0.5 MG capsule Take 3 capsules (1.5 mg) by mouth every 12 hours 180 capsule 11     ursodiol (ACTIGALL) 300 MG capsule TAKE ONE CAPSULE BY MOUTH TWICE A DAY 60 capsule 11     Allergies   Allergen Reactions     Levaquin [Levofloxacin] Shortness Of Breath         Review of Systems:  -As per HPI  -Constitutional: The patient denies fevers, chills, unintended weight loss, and night sweats.  -Skin: As above in HPI. No additional skin concerns.    Physical exam:  Vitals: There were no vitals taken for this visit.  GEN: This is a well developed, well-nourished male in no acute distress, in a pleasant mood.    SKIN: Full skin, which includes the head/face, both arms, chest, back, abdomen,both legs, genitalia and/or groin buttocks, digits and/or nails, was examined.    - Right nasal bridge, forehead - large pearly plaque with in-focus arborizing blood vessel and blue-gray leaf-like structure on dermoscopy  - Forehead, left cheek- pink-red plaques with in-focus telangiectasias on dermoscopy      - Frontal scalp - well-healed white U-shaped scar with no evidence of recurrence  - Face - multiple verrucous yellow papules in a malar distribution  - Right jawline - pink-red nodular  plaque  - Forehead - few pink papules and plaques with overlying rough gritty scale  - Trunk - multiple verrucous brown and black waxy stuck on plaques  - Mid-back - large soft freely-mobile nodule  - Intergluteal cleft - diffuse erythema with few pustules    - No other lesions of concern on areas examined.     Impression/Plan:    1. Neoplasms of uncertain behavior, right nasal bridge, left cheek, forehead - r/o BCC     Shave biopsy(performed by faculty): After discussion of benefits and risks including but not limited to bleeding, infection, scar, incomplete removal, recurrence, and non-diagnostic biopsy, written consent and photographs were obtained. Time-out was performed. The area was cleaned with isopropyl alcohol. 3 mL of 1% lidocaine with 1:100,000 epinephrine was injected to obtain adequate anesthesia of the lesions on the right nasal bridge, left cheek, forehead. A shave biopsy was performed. Hemostasis was achieved with aluminium chloride. Vaseline and a sterile dressing were applied. The patient tolerated the procedure and no complications were noted. The patient was provided with verbal and written post care instructions.     Patient voiced displeasure with his previous Mohs 10/3/2017 due to needing several stitches, a larger area removed than anticipated, and the need to come back to the clinic due to needlestick exposure.     Patient requests to be scheduled with Dr. Turner for future Mohs surgeries if needed.    2. Basal cell carcinoma, right jawline    Biopsy-proven 8/4/2017: Basal cell carcinoma, superficial and nodular types, extending to the lateral and deep margins     Patient agreeable to have Mohs surgery to remove this BCC    Patient would like to have all Mohs surgeries done at the same visit if possible.    Patient to be scheduled with Dr. Turner for removal of BCC on right jawline    3. History of nonmelanoma skin cancer, multiple - scalp, nasal ala, back    No evidence of recurrence in  previous areas of BCC removal    Most recent Mohs with rotational flap appears well-healed    4. Seborrheic keratosis, non irritated - large verrucous plaque on right shoulder treated with cryotherapy    Benign nature was discussed.     Cryotherapy procedure note (performed by faculty): After verbal consent and discussion of risks and benefits including but no limited to dyspigmentation/scar, blister, infection, recurrence, 1 lesion on the right shoulder was treated with 1-2mm freeze border for 2 cycles with liquid nitrogen. Post cryotherapy instructions were provided.     5. Actinic keratosis, forehead    Cryotherapy procedure note (performed by faculty): After verbal consent and discussion of risks and benefits including but no limited to dyspigmentation/scar, blister, infection, recurrence,3 lesions on the forehead were treated with 1-2mm freeze border for 2 cycles with liquid nitrogen. Post cryotherapy instructions were provided.     Patient educated that these lesions are considered pre-cancerous and have a low rate of progression to SCC.    6. Lipoma, mid-back     Benign nature was discussed.No further intervention required at this time.       7. Sebaceous hyperplasia, malar face    Benign nature was discussed.No further intervention required at this time.       Follow-up in 6 months, earlier for new or changing lesions.     Staff Involved:  Scribed by Tae Hua, MS4 for Dr. Barahona     I was present with the medical student who participated in the service and in the documentation of the note.  I have verified the history and personally performed the physical exam and medical decision making.  I agree with the assessment and plan of care as documented in the note. I performed the procedure(s).     Josefina Barahona MD  Dermatology Staff

## 2018-10-05 NOTE — LETTER
10/5/2018       RE: Lupillo Murguia  [address]  [place] [address]     Dear Colleague,    Thank you for referring your patient, Lupillo Murguia, to the [facility] DERMATOLOGY at [facility]. Please see a copy of my visit note below.    [facility] Dermatology Note      Dermatology Problem List:  1. Basal Cell Carcinoma Scalp, Right Vertex - treated with Mohs 10/03/17  2. Basal Cell Carcinoma Right Jawline - Patient declined Mohs at last appointment on 10/10/2017  3. Multiple other BCCs on scalp, nasal ala, back, right temple s/p removal  3. Liver transplant in 2010 on tacrolimus 1.5 mg bid    Encounter Date: Oct 5, 2018    CC:  Chief Complaint   Patient presents with     Skin Check     TX skin check, no particular areas of concern         History of Present Illness:  Mr. Lupillo Murguia is a 68 year old male who had liver transplantation currently on tacrolimus 1.5 mg bid who presents as a follow-up for skin check. He has a history of multiple BCCs. The patient was last seen 10/10/2017 when he was scheduled for Mohs of a biopsy-proven superficial and nodular BCC on his right jawline, but declined surgery as he wanted a second opinion. On discussion today, he notes that he did not receive receive second opinion.    Today, the patient notes no new/changing lesions. Wears sunscreen SPF 40+ on days he goes outside, does wear a hat when outside, practices sun avoidance.    No other skin complaints.     Past Medical History:   Patient Active Problem List   Diagnosis     Liver replaced by transplant (H)     BCC (basal cell carcinoma)     Bladder cancer (H)     Skin exam, screening for cancer     Past Medical History:   Diagnosis Date     BCC (basal cell carcinoma)      Liver transplanted (H)      Unspecified cerebral artery occlusion with cerebral infarction October 2013     Past Surgical History:   Procedure Laterality Date     COLONOSCOPY N/A 1/12/2017    Procedure:  COMBINED COLONOSCOPY, SINGLE OR MULTIPLE BIOPSY/POLYPECTOMY BY BIOPSY;  Surgeon: Le Simmons MD;  Location:  GI     MOHS MICROGRAPHIC PROCEDURE  3/7/13    right nasal ala, left upper back, right temple     TRANSPLANT         Social History:   reports that he has quit smoking. He has never used smokeless tobacco. He reports that he does not drink alcohol or use illicit drugs.    Family History:  Family History   Problem Relation Age of Onset     Skin Cancer No family hx of      Melanoma No family hx of        Medications:  Current Outpatient Prescriptions   Medication Sig Dispense Refill     Albuterol Sulfate (VENTOLIN HFA) 108 (90 BASE) MCG/ACT AERS Inhale 2 puffs into the lungs every 6 hours.       amLODIPine (NORVASC) 5 MG tablet Take 5 mg by mouth daily.       ASPIRIN PO Take 325 mg by mouth daily       fluticasone (FLOVENT HFA) 110 MCG/ACT inhaler Inhale 2 puffs into the lungs 2 times daily       metoprolol (LOPRESSOR) 25 MG tablet Take 25 mg by mouth daily        tacrolimus (GENERIC EQUIVALENT) 0.5 MG capsule Take 3 capsules (1.5 mg) by mouth every 12 hours 180 capsule 11     ursodiol (ACTIGALL) 300 MG capsule TAKE ONE CAPSULE BY MOUTH TWICE A DAY 60 capsule 11     Allergies   Allergen Reactions     Levaquin [Levofloxacin] Shortness Of Breath         Review of Systems:  -As per HPI  -Constitutional: The patient denies fevers, chills, unintended weight loss, and night sweats.  -Skin: As above in HPI. No additional skin concerns.    Physical exam:  Vitals: There were no vitals taken for this visit.  GEN: This is a well developed, well-nourished male in no acute distress, in a pleasant mood.    SKIN: Full skin, which includes the head/face, both arms, chest, back, abdomen,both legs, genitalia and/or groin buttocks, digits and/or nails, was examined.    - Right nasal bridge, forehead - large pearly plaque with in-focus arborizing blood vessel and blue-gray leaf-like structure on dermoscopy  -  Forehead, left cheek- pink-red plaques with in-focus telangiectasias on dermoscopy      - Frontal scalp - well-healed white U-shaped scar with no evidence of recurrence  - Face - multiple verrucous yellow papules in a malar distribution  - Right jawline - pink-red nodular plaque  - Forehead - few pink papules and plaques with overlying rough gritty scale  - Trunk - multiple verrucous brown and black waxy stuck on plaques  - Mid-back - large soft freely-mobile nodule  - Intergluteal cleft - diffuse erythema with few pustules    - No other lesions of concern on areas examined.     Impression/Plan:    1. Neoplasms of uncertain behavior, right nasal bridge, left cheek, forehead - r/o BCC     Shave biopsy(performed by faculty): After discussion of benefits and risks including but not limited to bleeding, infection, scar, incomplete removal, recurrence, and non-diagnostic biopsy, written consent and photographs were obtained. Time-out was performed. The area was cleaned with isopropyl alcohol. 3 mL of 1% lidocaine with 1:100,000 epinephrine was injected to obtain adequate anesthesia of the lesions on the right nasal bridge, left cheek, forehead. A shave biopsy was performed. Hemostasis was achieved with aluminium chloride. Vaseline and a sterile dressing were applied. The patient tolerated the procedure and no complications were noted. The patient was provided with verbal and written post care instructions.     Patient voiced displeasure with his previous Mohs 10/3/2017 due to needing several stitches, a larger area removed than anticipated, and the need to come back to the clinic due to needlestick exposure.     Patient requests to be scheduled with Dr. Turner for future Mohs surgeries if needed.    2. Basal cell carcinoma, right jawline    Biopsy-proven 8/4/2017: Basal cell carcinoma, superficial and nodular types, extending to the lateral and deep margins     Patient agreeable to have Mohs surgery to remove this  BCC    Patient would like to have all Mohs surgeries done at the same visit if possible.    Patient to be scheduled with Dr. Turner for removal of BCC on right jawline    3. History of nonmelanoma skin cancer, multiple - scalp, nasal ala, back    No evidence of recurrence in previous areas of BCC removal    Most recent Mohs with rotational flap appears well-healed    4. Seborrheic keratosis, non irritated - large verrucous plaque on right shoulder treated with cryotherapy    Benign nature was discussed.     Cryotherapy procedure note (performed by faculty): After verbal consent and discussion of risks and benefits including but no limited to dyspigmentation/scar, blister, infection, recurrence, 1 lesion on the right shoulder was treated with 1-2mm freeze border for 2 cycles with liquid nitrogen. Post cryotherapy instructions were provided.     5. Actinic keratosis, forehead    Cryotherapy procedure note (performed by faculty): After verbal consent and discussion of risks and benefits including but no limited to dyspigmentation/scar, blister, infection, recurrence,3 lesions on the forehead were treated with 1-2mm freeze border for 2 cycles with liquid nitrogen. Post cryotherapy instructions were provided.     Patient educated that these lesions are considered pre-cancerous and have a low rate of progression to SCC.    6. Lipoma, mid-back     Benign nature was discussed.No further intervention required at this time.       7. Sebaceous hyperplasia, malar face    Benign nature was discussed.No further intervention required at this time.       Follow-up in 6 months, earlier for new or changing lesions.     Staff Involved:  Scribed by Tae Hua MS4 for Dr. Barahona     I was present with the medical student who participated in the service and in the documentation of the note.  I have verified the history and personally performed the physical exam and medical decision making.  I agree with the assessment and plan of care as  documented in the note. I performed the procedure(s).     Josefina Barahona MD  Dermatology Staff

## 2018-10-05 NOTE — NURSING NOTE
Lidocaine1 % injection   3mL once for one use, starting 10/5/2018 ending 10/5/2018,  2mL disp, R-0, injection  Injected by Madyson Steele, YAKELINN

## 2018-10-05 NOTE — MR AVS SNAPSHOT
After Visit Summary   10/5/2018    Lupillo Murguia    MRN: [id number]           Patient Information     Date Of Birth          1950        Visit Information        Provider Department      10/5/2018 10:30 AM Josefina Barahona MD [facility] Dermatology        Today's Diagnoses     Neoplasm of uncertain behavior of skin    -  1      Care Instructions    Wound Care After a Biopsy    What is a skin biopsy?  A skin biopsy allows the doctor to examine a very small piece of tissue under the microscope to determine the diagnosis and the best treatment for the skin condition. A local anesthetic (numbing medicine)  is injected with a very small needle into the skin area to be tested. A small piece of skin is taken from the area. Sometimes a suture (stitch) is used.     What are the risks of a skin biopsy?  I will experience scar, bleeding, swelling, pain, crusting and redness. I may experience incomplete removal or recurrence. Risks of this procedure are excessive bleeding, bruising, infection, nerve damage, numbness, thick (hypertrophic or keloidal) scar and non-diagnostic biopsy.    How should I care for my wound for the first 24 hours?    Keep the wound dry and covered for 24 hours    If it bleeds, hold direct pressure on the area for 15 minutes. If bleeding does not stop then go to the emergency room    Avoid strenuous exercise the first 1-2 days or as your doctor instructs you    How should I care for the wound after 24 hours?    After 24 hours, remove the bandage    You may bathe or shower as normal    If you had a scalp biopsy, you can shampoo as usual and can use shower water to clean the biopsy site daily    Clean the wound twice a day with gentle soap and water    Do not scrub, be gentle    Apply white petroleum/Vaseline after cleaning the wound with a cotton swab or a clean finger, and keep the site covered with a Bandaid /bandage. Bandages are not necessary with a scalp biopsy    If you are  unable to cover the site with a Bandaid /bandage, re-apply ointment 2-3 times a day to keep the site moist. Moisture will help with healing    Avoid strenuous activity for first 1-2 days    Avoid lakes, rivers, pools, and oceans until the stitches are removed or the site is healed    How do I clean my wound?    Wash hands thoroughly with soap or use hand  before all wound care    Clean the wound with gentle soap and water    Apply white petroleum/Vaseline  to wound after it is clean    Replace the Bandaid /bandage to keep the wound covered for the first few days or as instructed by your doctor    If you had a scalp biopsy, warm shower water to the area on a daily basis should suffice    What should I use to clean my wound?     Cotton-tipped applicators (Qtips )    White petroleum jelly (Vaseline ). Use a clean new container and use Q-tips to apply.    Bandaids   as needed    Gentle soap     How should I care for my wound long term?    Do not get your wound dirty    Keep up with wound care for one week or until the area is healed.    A small scab will form and fall off by itself when the area is completely healed. The area will be red and will become pink in color as it heals. Sun protection is very important for how your scar will turn out. Sunscreen with an SPF 30 or greater is recommended once the area is healed.    If you have stitches, stitches need to be removed in 14 days. You may return to our clinic for this or you may have it done locally at your doctor s office.    You should have some soreness but it should be mild and slowly go away over several days. Talk to your doctor about using tylenol for pain,    When should I call my doctor?  If you have increased:     Pain or swelling    Pus or drainage (clear or slightly yellow drainage is ok)    Temperature over 100F    Spreading redness or warmth around wound    When will I hear about my results?  The biopsy results can take 2-3 weeks to come back.  The clinic will call you with the results, send you a mychart message, or have you schedule a follow-up clinic or phone time to discuss the results. Contact our clinics if you do not hear from us in 3 weeks.     Who should I call with questions?    [facility]: [phone]     [facility]: [phone]    For urgent needs outside of business hours call the [facility] at [phone] and ask for the dermatology resident on call        ,          Follow-ups after your visit        Your next 10 appointments already scheduled     Sep 24, 2019 11:00 AM CDT   (Arrive by 10:45 AM)   Return Liver Transplant with Le Simmons MD   [facility] Hepatology ([facility] [place])    [address]  [address]  [facility] [address] [phone]              Who to contact     Please call your clinic at [phone] to:    Ask questions about your health    Make or cancel appointments    Discuss your medicines    Learn about your test results    Speak to your doctor            Additional Information About Your Visit        Care EveryWhere ID     This is your Care EveryWhere ID. This could be used by other organizations to access your [facility] medical records  [id number]         Blood Pressure from Last 3 Encounters:   09/25/18 164/80   10/10/17 (!) 154/94   10/03/17 139/81    Weight from Last 3 Encounters:   09/25/18 82.5 kg (181 lb 12.8 oz)   09/26/17 79.2 kg (174 lb 9.6 oz)   04/13/17 74.8 kg (165 lb)              Today, you had the following     No orders found for display       Primary Care Provider Office Phone # Fax #    Joaquin Haines [phone]       [facility] [address]  [address] [facility] [address]        Equal Access to Services     MADDIE HERNANDEZ AH: Jessica Boo, esme mackey, qalorenzo kaalash schulz, felix hunter . So [facility]  [phone].    ATENCIÓN: Si camron hammond, tiene a saleh disposición servicios gratuitos de asistencia lingüística. Catracho dick [phone].    We comply with applicable federal civil rights laws and [place] laws. We do not discriminate on the basis of race, color, national origin, age, disability, sex, sexual orientation, or gender identity.            Thank you!     Thank you for choosing [facility] DERMATOLOGY  for your care. Our goal is always to provide you with excellent care. Hearing back from our patients is one way we can continue to improve our services. Please take a few minutes to complete the written survey that you may receive in the mail after your visit with us. Thank you!             Your Updated Medication List - Protect others around you: Learn how to safely use, store and throw away your medicines at www.disposemymeds.org.          This list is accurate as of 10/5/18 12:07 PM.  Always use your most recent med list.                   Brand Name Dispense Instructions for use Diagnosis    amLODIPine 5 MG tablet    NORVASC     Take 5 mg by mouth daily.        ASPIRIN PO      Take 325 mg by mouth daily        fluticasone 110 MCG/ACT Inhaler    FLOVENT HFA     Inhale 2 puffs into the lungs 2 times daily    Liver replaced by transplant (H)       metoprolol tartrate 25 MG tablet    LOPRESSOR     Take 25 mg by mouth daily        tacrolimus 0.5 MG capsule    GENERIC EQUIVALENT    180 capsule    Take 3 capsules (1.5 mg) by mouth every 12 hours    Liver replaced by transplant (H)       ursodiol 300 MG capsule    ACTIGALL    60 capsule    TAKE ONE CAPSULE BY MOUTH TWICE A DAY    Liver replaced by transplant (H)       VENTOLIN  (90 Base) MCG/ACT inhaler   Generic drug:  albuterol      Inhale 2 puffs into the lungs every 6 hours.

## 2018-10-05 NOTE — PATIENT INSTRUCTIONS

## 2018-10-08 LAB — COPATH REPORT: NORMAL

## 2018-10-16 ENCOUNTER — TELEPHONE (OUTPATIENT)
Dept: DERMATOLOGY | Facility: CLINIC | Age: 68
End: 2018-10-16

## 2018-10-16 DIAGNOSIS — C44.91 BASAL CELL CARCINOMA: Primary | ICD-10-CM

## 2018-10-16 NOTE — TELEPHONE ENCOUNTER
Talked to Lupillo discussed biopsy results with him. Transferred him to Derm surgery schedulers.    KAREL Johnson

## 2018-10-22 ENCOUNTER — OFFICE VISIT (OUTPATIENT)
Dept: DERMATOLOGY | Facility: CLINIC | Age: 68
End: 2018-10-22
Payer: MEDICARE

## 2018-10-22 DIAGNOSIS — C44.319 BASAL CELL CARCINOMA OF LEFT FOREHEAD: ICD-10-CM

## 2018-10-22 DIAGNOSIS — C44.319 BASAL CELL CARCINOMA OF LEFT CHEEK: ICD-10-CM

## 2018-10-22 DIAGNOSIS — C44.319 BASAL CELL CARCINOMA OF JAWLINE: Primary | ICD-10-CM

## 2018-10-22 DIAGNOSIS — C44.311 BASAL CELL CARCINOMA OF RIGHT SIDE OF NOSE: ICD-10-CM

## 2018-10-22 ASSESSMENT — PAIN SCALES - GENERAL: PAINLEVEL: NO PAIN (0)

## 2018-10-22 NOTE — PROGRESS NOTES
[facility] Dermatology Note    Dermatology Surgery Clinic  Formerly [facility]  Clinics and Surgery Center  [address] [place] [id number]    Dermatology Problem List:  1. Liver transplant in 2010 on tacrolimus 1.5 mg bid  2. Hx of NMSC:  - R Vertex, BCC - treated with Mohs 10/03/17  - R Jaw line, BCC - Mohs TBD  - R nose, BCC - Mohs TBD  - L cheek, BCC - Mohs TBD  - L forehead BCC - Mohs TBD  - BCCs on scalp, nasal ala, back, right temple s/p removal    Encounter Date: Oct 22, 2018    CC:  Chief Complaint   Patient presents with     Consult For     mohs BCC x3; L cheek, L forehead, R nose, R jawline       History of Present Illness:  Mr. Lupillo Murguia is a 68 year old male with a history of immunosuppression who presents today for a Mohs consultation regarding BCCs of 4 sites: right side of Nose, left forehead, left cheek, and right jaw line. The BCCs of the R nose, L forehead, and L cheek were diagnosed during his visit on 10/05/2018. The R jaw line was diagnosed late 2017, but the patient declined Mohs for this site. Today he states he has had difficulties with excisions and Mohs in the past, and had concerns about the duration of the upcoming procedures. The patient is otherwise feeling well. There are no other skin concerns at this time.      Past Medical History:   Patient Active Problem List   Diagnosis     Liver replaced by transplant (H)     BCC (basal cell carcinoma)     Bladder cancer (H)     Skin exam, screening for cancer     Past Medical History:   Diagnosis Date     BCC (basal cell carcinoma)      Liver transplanted (H)      Unspecified cerebral artery occlusion with cerebral infarction October 2013     Past Surgical History:   Procedure Laterality Date     COLONOSCOPY N/A 1/12/2017    Procedure: COMBINED COLONOSCOPY, SINGLE OR MULTIPLE BIOPSY/POLYPECTOMY BY BIOPSY;  Surgeon: Le Simmons MD;  Location: [facility]     MOHS MICROGRAPHIC PROCEDURE   3/7/13    right nasal ala, left upper back, right temple     TRANSPLANT         Social History:  Social History     Social History     Marital status:      Spouse name: N/A     Number of children: N/A     Years of education: N/A     Social History Main Topics     Smoking status: Former Smoker     Smokeless tobacco: Never Used     Alcohol use No     Drug use: No     Sexual activity: Not Asked     Other Topics Concern     None     Social History Narrative       Family History:  Family History   Problem Relation Age of Onset     Skin Cancer No family hx of      Melanoma No family hx of         Medications:  Current Outpatient Prescriptions   Medication Sig Dispense Refill     Albuterol Sulfate (VENTOLIN HFA) 108 (90 BASE) MCG/ACT AERS Inhale 2 puffs into the lungs every 6 hours.       amLODIPine (NORVASC) 5 MG tablet Take 5 mg by mouth daily.       ASPIRIN PO Take 325 mg by mouth daily       fluticasone (FLOVENT HFA) 110 MCG/ACT inhaler Inhale 2 puffs into the lungs 2 times daily       metoprolol (LOPRESSOR) 25 MG tablet Take 25 mg by mouth daily        tacrolimus (GENERIC EQUIVALENT) 0.5 MG capsule Take 3 capsules (1.5 mg) by mouth every 12 hours 180 capsule 11     ursodiol (ACTIGALL) 300 MG capsule TAKE ONE CAPSULE BY MOUTH TWICE A DAY 60 capsule 11       Allergies   Allergen Reactions     Levaquin [Levofloxacin] Shortness Of Breath       Review of Systems:  -Skin Establ Pt: The patient denies any new rash, pruritus, or lesions that are symptomatic, changing or bleeding, except as per HPI.  -Constitutional: The patient is feeling generally well.    Physical exam:  Vitals: There were no vitals taken for this visit.  GEN: This is a well developed, well-nourished male in no acute distress, in a pleasant mood.    SKIN: Focused examination of the face was performed.  - 1.3 cm pearly papule, L forehead  - 1.5 cm pearly papule, R jaw line  - 8 mm pearly papule, R nose  - 8 mm pearly papule, L cheek  - No other  lesions of concern on areas examined.     Impression/Plan:  1. Basal cell carcinoma of the R Jaw line, R nose, L forehead, and L cheek. Mohs surgery recommended.     Discussed nature of BCC with patient.     Risks, benefits, and process of Mohs micrographic surgery were discussed including possibility of damage to surrounding anatomical structures and infection. Patient is comfortable proceeding with the surgery; consent form was obtained. He will be scheduled at his convenience.    R sided BCC sites to be removed during 1st surgery; L sided BCC sites for 2nd surgery.    Follow-up as scheduled for [facility].       Staff Involved:    Scribe Disclosure  I, Ignacio Ryan, am serving as a scribe to document services personally performed by Dr. Andres Turner, based on data collection and the provider's statements to me.     Attending Attestation  I attest that the Scribe recorded the interview and exam that I personally performed.  I have reviewed the note and edited it as necessary.    Andres Turner M.D.    Director of Dermatologic Surgery  Department of Dermatology  [facility]

## 2018-10-22 NOTE — MR AVS SNAPSHOT
After Visit Summary   10/22/2018    Lupillo Murguia    MRN: [id number]           Patient Information     Date Of Birth          1950        Visit Information        Provider Department      10/22/2018 2:45 PM Andres Turner MD [facility] Dermatologic Surgery        Care Instructions    Mohs Cutaneous Micrographic Surgery Unit  Andres Turner DO      Pre-Surgical Instruction Sheet    1. Expect to be here majority of the morning.  The Mohs surgical procedure can be an extensive surgery requiring multiple stages. Each stage may take 1-2 hours.    ? You may eat breakfast  ? You may bring snacks or beverages with you  ? Take all normal medications morning of surgery -- unless otherwise indicated by your physician  ? If you have an artificial heart valve, or joint replacement within two years, we will prescribe an antibiotic. Please take one hour prior to surgery.    2. You will need a  to be with you if your surgical site is close to your eyes. You can get swelling/bruising immediately after surgery and will go home with a big bulky bandage that could obstruct your view of driving safely.    3. Wear comfortable clothing -- preferably a button down shirt or a loose fitted shirt. (to avoid pulling over pressure bandage off when you get home) If your surgery site is on your leg, try wearing loose fitted pants. If your surgery site is on your arm, try wearing a short-sleeve shirt.    4. Bring reading material or other items to help pass time. We do have internet access available if you own a laptop, iPad, etc.)    5. Women - do NOT wear make-up. We will be washing it off anyone needing surgery on the face    6. Do NOT stop blood thinning medication unless instructed to do so by your surgeon. This will include: Warfarin, Coumadin, Jantoven, Aspirin, Plavix and Pradaxa. If you are taking Warfarin or Coumadin, please have your INR blood test results sent to our office no more than 7 days prior to your surgery.      7. Tylenol is a good alternative to take for headaches and pain, and can be taken throughout your surgery and postoperative recovery.    8. If your surgery is on your trunk, arms, hands, legs, feet, fingernail or a toenail, please wash the area with Hibiclens, an over-the-counter antiseptic soap, the night before and morning of your surgery.     If you have any questions, please feel free to contact us.    Phone numbers:  During business hours (M-F 8:00-4:30 p.m.)  [facility]:  [phone] - select option 1 for appt. desk  [phone] - select option 3 for nurse triage line  [facility]:   [phone] - goes straight to call center   ---------------------------------------------------------  Evenings/Weekends/Holidays  Hospital - [phone]   TTY for hearing uvhtnnrm-[phone]  *Ask  to page dermatologist on-call  Emergency Hhjv-[phone]  TTY for hearing impaired- [phone]                  Follow-ups after your visit        Your next 10 appointments already scheduled     Nov 01, 2018  7:30 AM CDT   (Arrive by 7:15 AM)   New Mohs with Andres Turner MD   [facility] ([facility])    [address] [place] [id number] [phone]            Nov 12, 2018  7:30 AM CST   (Arrive by 7:15 AM)   Return Mohs with Andres Turner MD   [facility] ([facility])    [address] [place] [address] [phone]            Sep 24, 2019 11:00 AM CDT   (Arrive by 10:45 AM)   Return Liver Transplant with Le Simmons MD   [facility] ([facility])    [address] [place] [address]  [address]  [facility] [id number] [phone]              Who to contact     Please call your clinic at [phone] to:    Ask questions about your health    Make or cancel  appointments    Discuss your medicines    Learn about your test results    Speak to your doctor            Additional Information About Your Visit        Care EveryWhere ID     This is your Care EveryWhere ID. This could be used by other organizations to access your [facility] [place] medical records  [id number]         Blood Pressure from Last 3 Encounters:   09/25/18 164/80   10/10/17 (!) 154/94   10/03/17 139/81    Weight from Last 3 Encounters:   09/25/18 82.5 kg (181 lb 12.8 oz)   09/26/17 79.2 kg (174 lb 9.6 oz)   04/13/17 74.8 kg (165 lb)              Today, you had the following     No orders found for display       Primary Care Provider Office Phone # Fax #    Joaquin Haines [phone]       [facility] [address]  [facility] [address]        Equal Access to Services     [facility] : Hadii aad ku hadasho Soomaali, waaxda luqadaha, qaybta kaalmada adeegyada, waxay gurvinderin hayhardeep hunter . So [facility] [phone].    ATENCIÓN: Si habla español, tiene a saleh disposición servicios gratuitos de asistencia lingüística. Llame al [phone].    We comply with applicable federal civil rights laws and [place] laws. We do not discriminate on the basis of race, color, national origin, age, disability, sex, sexual orientation, or gender identity.            Thank you!     Thank you for choosing [facility] DERMATOLOGIC SURGERY  for your care. Our goal is always to provide you with excellent care. Hearing back from our patients is one way we can continue to improve our services. Please take a few minutes to complete the written survey that you may receive in the mail after your visit with us. Thank you!             Your Updated Medication List - Protect others around you: Learn how to safely use, store and throw away your medicines at www.disposemymeds.org.          This list is accurate as of 10/22/18  3:19 PM.  Always use your most recent med list.                    Brand Name Dispense Instructions for use Diagnosis    amLODIPine 5 MG tablet    NORVASC     Take 5 mg by mouth daily.        ASPIRIN PO      Take 325 mg by mouth daily        fluticasone 110 MCG/ACT Inhaler    FLOVENT HFA     Inhale 2 puffs into the lungs 2 times daily    Liver replaced by transplant (H)       metoprolol tartrate 25 MG tablet    LOPRESSOR     Take 25 mg by mouth daily        tacrolimus 0.5 MG capsule    GENERIC EQUIVALENT    180 capsule    Take 3 capsules (1.5 mg) by mouth every 12 hours    Liver replaced by transplant (H)       ursodiol 300 MG capsule    ACTIGALL    60 capsule    TAKE ONE CAPSULE BY MOUTH TWICE A DAY    Liver replaced by transplant (H)       VENTOLIN  (90 Base) MCG/ACT inhaler   Generic drug:  albuterol      Inhale 2 puffs into the lungs every 6 hours.

## 2018-10-22 NOTE — NURSING NOTE
Chief Complaint   Patient presents with     Consult For     mohs BCC x3; L cheek, L forehead, R nose, R jawline     Cassie Thakkar, EMT

## 2018-10-22 NOTE — PROGRESS NOTES
History of Skin cancer : yes    History of Mohs Surgery: yes    History of a solid organ transplant: yes Organ(s): liver Year(s): 2010 Creatinine: no Bone Marrow Transplant : no     Immunosuppressive Medications: yes     HIV or Hepatitis B or C : no    Chronic lymphocytic leukemia: no    Diabetes: no     Any Bleeding disorders: no    Do you have a Pacemaker or Defibrillator: no     Artificial heart valve: no     Joint Replacement in the last 2 years: yes, knee     Do you typically take Prophylactic Antibiotics before seeing a dentist or having a procedure?: yes    If yes, verify that patient has the antibiotic on hand and instruct to take one hour before their surgery appointment.    If they do not have the antibiotic, verify the patients pharmacy and notify surgeon by printing the pre-mohs call sheet.    Do you wear a C Pap Mask: no    If yes, and procedure is on the face, ask patient to bring in the mask with them (Mask only)    Do you have any mobility issues: no    If yes, is a van service is required to transport you to/from your appointment? no    Smoking History (tobacco of any sort): former    Do you have any other health issues that we should know about? no    Do you take any of the following Blood Thinners:    Aspirin: yes    Plavix/Aggrastat/Brilinta: no    Warfarin: no     Pradaxa/Eliquis/Xarelto: no    Ibuprofen (Advil/Motrin): no    Naproxen (Aleve): no    Vitamin E: no    Fish Oil: no    Ginkgo biloba: no    Other:no    Yes Patient was instructed of the following: Ibuprofen, naproxen, Vitamin E, Fish Oil, and Ginkgo biloba should be stopped 1 week prior to and 1 week after the procedure.    Medication Allergies: yes    Are medications in Epic: yes    Yes Patient was reminded to take all medications as usual, other than those listed above, on the day of surgery    Yes Patient was instructed to bring all medications to clinic on day of surgery    Yes Patient will bring a     (A  is helpful  for the following reasons: some patients need medications to relax, but once given, patients should not drive; sometimes bandages obstruct your vision making it difficult to see and unsafe to drive; the day can get long so it s nice to have a mike; sometimes the procedure wears people out/makes them quite tired)    Yes Photograph of the surgical site(s) is/are available    Yes Patient was reminded that this can be an all-day procedure and that no other appointments should be scheduled on the day of Mohs

## 2018-10-22 NOTE — LETTER
10/22/2018     RE: Lupillo Murguia  [address] [place] [address]     Dear Colleague,    Thank you for referring your patient, Lupillo Murguia, to the [facility] DERMATOLOGIC SURGERY at [facility]. Please see a copy of my visit note below.    History of Skin cancer : yes    History of Mohs Surgery: yes    History of a solid organ transplant: yes Organ(s): liver Year(s): 2010 Creatinine: no Bone Marrow Transplant : no     Immunosuppressive Medications: yes     HIV or Hepatitis B or C : no    Chronic lymphocytic leukemia: no    Diabetes: no     Any Bleeding disorders: no    Do you have a Pacemaker or Defibrillator: no     Artificial heart valve: no     Joint Replacement in the last 2 years: yes, knee     Do you typically take Prophylactic Antibiotics before seeing a dentist or having a procedure?: yes    If yes, verify that patient has the antibiotic on hand and instruct to take one hour before their surgery appointment.    If they do not have the antibiotic, verify the patients pharmacy and notify surgeon by printing the pre-mohs call sheet.    Do you wear a C Pap Mask: no    If yes, and procedure is on the face, ask patient to bring in the mask with them (Mask only)    Do you have any mobility issues: no    If yes, is a van service is required to transport you to/from your appointment? no    Smoking History (tobacco of any sort): former    Do you have any other health issues that we should know about? no    Do you take any of the following Blood Thinners:    Aspirin: yes    Plavix/Aggrastat/Brilinta: no    Warfarin: no     Pradaxa/Eliquis/Xarelto: no    Ibuprofen (Advil/Motrin): no    Naproxen (Aleve): no    Vitamin E: no    Fish Oil: no    Ginkgo biloba: no    Other:no    Yes Patient was instructed of the following: Ibuprofen, naproxen, Vitamin E, Fish Oil, and Ginkgo biloba should be stopped 1 week prior to and 1 week after the procedure.    Medication Allergies: yes    Are  medications in Epic: yes    Yes Patient was reminded to take all medications as usual, other than those listed above, on the day of surgery    Yes Patient was instructed to bring all medications to clinic on day of surgery    Yes Patient will bring a     (A  is helpful for the following reasons: some patients need medications to relax, but once given, patients should not drive; sometimes bandages obstruct your vision making it difficult to see and unsafe to drive; the day can get long so it s nice to have a mike; sometimes the procedure wears people out/makes them quite tired)    Yes Photograph of the surgical site(s) is/are available    Yes Patient was reminded that this can be an all-day procedure and that no other appointments should be scheduled on the day of Mohs        [facility] Dermatology Note    Dermatology Surgery Clinic  [facility]  [address] [place] [id number]    Dermatology Problem List:  1. Liver transplant in 2010 on tacrolimus 1.5 mg bid  2. Hx of NMSC:  - R Vertex, BCC - treated with Mohs 10/03/17  - R Jaw line, BCC - Mohs TBD  - R nose, BCC - Mohs TBD  - L cheek, BCC - Mohs TBD  - L forehead BCC - Mohs TBD  - BCCs on scalp, nasal ala, back, right temple s/p removal    Encounter Date: Oct 22, 2018    CC:  Chief Complaint   Patient presents with     Consult For     mohs BCC x3; L cheek, L forehead, R nose, R jawline       History of Present Illness:  Mr. Lupillo Murguia is a 68 year old male with a history of immunosuppression who presents today for a Mohs consultation regarding BCCs of 4 sites: right side of Nose, left forehead, left cheek, and right jaw line. The BCCs of the R nose, L forehead, and L cheek were diagnosed during his visit on 10/05/2018. The R jaw line was diagnosed late 2017, but the patient declined Mohs for this site. Today he states he has had difficulties with excisions and Mohs in the  past, and had concerns about the duration of the upcoming procedures. The patient is otherwise feeling well. There are no other skin concerns at this time.      Past Medical History:   Patient Active Problem List   Diagnosis     Liver replaced by transplant (H)     BCC (basal cell carcinoma)     Bladder cancer (H)     Skin exam, screening for cancer     Past Medical History:   Diagnosis Date     BCC (basal cell carcinoma)      Liver transplanted (H)      Unspecified cerebral artery occlusion with cerebral infarction October 2013     Past Surgical History:   Procedure Laterality Date     COLONOSCOPY N/A 1/12/2017    Procedure: COMBINED COLONOSCOPY, SINGLE OR MULTIPLE BIOPSY/POLYPECTOMY BY BIOPSY;  Surgeon: Le Simmons MD;  Location:  GI     MOHS MICROGRAPHIC PROCEDURE  3/7/13    right nasal ala, left upper back, right temple     TRANSPLANT         Social History:  Social History     Social History     Marital status:      Spouse name: N/A     Number of children: N/A     Years of education: N/A     Social History Main Topics     Smoking status: Former Smoker     Smokeless tobacco: Never Used     Alcohol use No     Drug use: No     Sexual activity: Not Asked     Other Topics Concern     None     Social History Narrative       Family History:  Family History   Problem Relation Age of Onset     Skin Cancer No family hx of      Melanoma No family hx of         Medications:  Current Outpatient Prescriptions   Medication Sig Dispense Refill     Albuterol Sulfate (VENTOLIN HFA) 108 (90 BASE) MCG/ACT AERS Inhale 2 puffs into the lungs every 6 hours.       amLODIPine (NORVASC) 5 MG tablet Take 5 mg by mouth daily.       ASPIRIN PO Take 325 mg by mouth daily       fluticasone (FLOVENT HFA) 110 MCG/ACT inhaler Inhale 2 puffs into the lungs 2 times daily       metoprolol (LOPRESSOR) 25 MG tablet Take 25 mg by mouth daily        tacrolimus (GENERIC EQUIVALENT) 0.5 MG capsule Take 3 capsules (1.5 mg) by  mouth every 12 hours 180 capsule 11     ursodiol (ACTIGALL) 300 MG capsule TAKE ONE CAPSULE BY MOUTH TWICE A DAY 60 capsule 11       Allergies   Allergen Reactions     Levaquin [Levofloxacin] Shortness Of Breath       Review of Systems:  -Skin Establ Pt: The patient denies any new rash, pruritus, or lesions that are symptomatic, changing or bleeding, except as per HPI.  -Constitutional: The patient is feeling generally well.    Physical exam:  Vitals: There were no vitals taken for this visit.  GEN: This is a well developed, well-nourished male in no acute distress, in a pleasant mood.    SKIN: Focused examination of the face was performed.  - 1.3 cm pearly papule, L forehead  - 1.5 cm pearly papule, R jaw line  - 8 mm pearly papule, R nose  - 8 mm pearly papule, L cheek  - No other lesions of concern on areas examined.     Impression/Plan:  1. Basal cell carcinoma of the R Jaw line, R nose, L forehead, and L cheek. Mohs surgery recommended.     Discussed nature of BCC with patient.     Risks, benefits, and process of Mohs micrographic surgery were discussed including possibility of damage to surrounding anatomical structures and infection. Patient is comfortable proceeding with the surgery; consent form was obtained. He will be scheduled at his convenience.    R sided BCC sites to be removed during 1st surgery; L sided BCC sites for 2nd surgery.    Follow-up as scheduled for [facility].       Staff Involved:    Scribe Disclosure  I, Ignacio Ryan, am serving as a scribe to document services personally performed by Dr. Andres Turner, based on data collection and the provider's statements to me.     Attending Attestation  I attest that the Scribe recorded the interview and exam that I personally performed.  I have reviewed the note and edited it as necessary.    Andres Turner M.D.    Director of Dermatologic Surgery  Department of Dermatology  [facility]

## 2018-10-22 NOTE — PATIENT INSTRUCTIONS
Mohs Cutaneous Micrographic Surgery Unit  Andres Turner DO      Pre-Surgical Instruction Sheet    1. Expect to be here majority of the morning.  The Mohs surgical procedure can be an extensive surgery requiring multiple stages. Each stage may take 1-2 hours.    ? You may eat breakfast  ? You may bring snacks or beverages with you  ? Take all normal medications morning of surgery -- unless otherwise indicated by your physician  ? If you have an artificial heart valve, or joint replacement within two years, we will prescribe an antibiotic. Please take one hour prior to surgery.    2. You will need a  to be with you if your surgical site is close to your eyes. You can get swelling/bruising immediately after surgery and will go home with a big bulky bandage that could obstruct your view of driving safely.    3. Wear comfortable clothing -- preferably a button down shirt or a loose fitted shirt. (to avoid pulling over pressure bandage off when you get home) If your surgery site is on your leg, try wearing loose fitted pants. If your surgery site is on your arm, try wearing a short-sleeve shirt.    4. Bring reading material or other items to help pass time. We do have internet access available if you own a laptop, iPad, etc.)    5. Women - do NOT wear make-up. We will be washing it off anyone needing surgery on the face    6. Do NOT stop blood thinning medication unless instructed to do so by your surgeon. This will include: Warfarin, Coumadin, Jantoven, Aspirin, Plavix and Pradaxa. If you are taking Warfarin or Coumadin, please have your INR blood test results sent to our office no more than 7 days prior to your surgery.     7. Tylenol is a good alternative to take for headaches and pain, and can be taken throughout your surgery and postoperative recovery.    8. If your surgery is on your trunk, arms, hands, legs, feet, fingernail or a toenail, please wash the area with Hibiclens, an over-the-counter antiseptic soap,  the night before and morning of your surgery.     If you have any questions, please feel free to contact us.    Phone numbers:  During business hours (M-F 8:00-4:30 p.m.)  [facility]:  [phone] - select option 1 for appt. desk  [phone] - select option 3 for nurse triage line  [facility]:   [phone] - goes straight to call center   ---------------------------------------------------------  Evenings/Weekends/Holidays  Hospital - [phone]   TTY for hearing eudprque-[phone]  *Ask  to page dermatologist on-call  Emergency Jbeh-[phone]  TTY for hearing impaired- [phone]

## 2018-11-01 ENCOUNTER — OFFICE VISIT (OUTPATIENT)
Dept: DERMATOLOGY | Facility: CLINIC | Age: 68
End: 2018-11-01
Payer: MEDICARE

## 2018-11-01 VITALS — DIASTOLIC BLOOD PRESSURE: 88 MMHG | SYSTOLIC BLOOD PRESSURE: 141 MMHG | HEART RATE: 79 BPM

## 2018-11-01 DIAGNOSIS — C44.319 BASAL CELL CARCINOMA OF JAWLINE: Primary | ICD-10-CM

## 2018-11-01 DIAGNOSIS — C44.311 BASAL CELL CARCINOMA OF RIGHT SIDE OF NOSE: ICD-10-CM

## 2018-11-01 ASSESSMENT — PAIN SCALES - GENERAL
PAINLEVEL: NO PAIN (0)
PAINLEVEL: NO PAIN (0)

## 2018-11-01 NOTE — PROGRESS NOTES
MOHS MICROGRAPHIC SURGERY REPORT   November 1, 2018    Dermatology Surgery Clinic  [facility]  [address] [place] [address]    Surgeon:  Andres Turner MD  Scrub:  iKersten Bautista RN    INDICATION:    Preoperative Diagnosis: primary basal cell carcinoma  Location: R jaw line  Postoperative Diagnosis: same  Preoperative Lesion size: 1.8 x 1.6 cm    After appropriate discussion and informed consent for Mohs surgery and possible repair of the Mohs surgery defect, the patient underwent Mohs surgery as follows:    STAGE I:  The patient was placed on the operating room table.  The area was cleansed with chlorhexidine and infiltrated with 1% Lidocaine and epinephrine. Tumor was debulked. Using a #15-blade, complete excision was made around the tumor in 1 section.  Hemostasis was obtained by electrodesiccation.  A dressing was placed.  Tissue was divided into 1 tissue block that was subsequently mapped, color coded and processed in the Mohs Laboratory.  Microscopic tumor was not found in the tissue block.    With the lesion clear of micrographic tumor, surgery was considered complete.  The defect extended to the fat and measured 2.0 x 1.8 cm.    RECONSTRUCTIVE DERMATOLOGIC SURGERY REPORT  We discussed the options for wound management in full with the patient including risks/benefits/possible outcomes.     Complex Layered Linear Closure:  Because of the size and full thickness nature of the defect and tightness of the surrounding skin, a complex closure was planned. Patient was prepped with Betasept, local anesthesia administered, and draped in a sterile fashion. The Mohs defect/wound was circumferentially debeveled. Burow's triangles were excised in the relaxed skin tension lines from opposite ends of the defect, oriented in relaxed skin tension lines, and the wound edges were widely undermined by dissection in the subcutaneous plane until adequate tissue mobility had  been obtained and tissue could be cosmetically re-draped. Hemostasis was obtained. Tension reduction sutures placed at the dermal subcutaneous plane at the margins of undermining. The wound edges were then advanced and closed in a layered fashion using deep intradermal 4-0 Monocryl and superficial running 5-0 fast absorbing gut sutures. Postoperative length was 3.9 cm.     Estimated blood loss, minimal; complications, none; bandaging and wound care, routine. Patient was given written and verbal wound care procedures prior to discharge. Patient was discharged in good condition and will return for evaluation as needed.      Staff Involved:    Scribe Disclosure  I, Ignacio Ryan, am serving as a scribe to document services personally performed by Dr. Andres Turner, based on data collection and the provider's statements to me.     Attending attestation:  I personally performed the entire procedure.  I have reviewed the note and edited it as necessary, and agree with its contents.    Andres Turner M.D.    Director of Dermatologic Surgery  Department of Dermatology  [facility]    Dermatology Surgery Clinic  [facility] [place] [id number]

## 2018-11-01 NOTE — LETTER
11/1/2018       RE: Lupillo Murguia  [address]  [place] [address]     Dear Colleague,    Thank you for referring your patient, Lupillo Murguia, to the [facility] DERMATOLOGIC SURGERY at [facility]. Please see a copy of my visit note below.    MOHS MICROGRAPHIC SURGERY REPORT   November 1, 2018    Dermatology Surgery Clinic  [facility]  [address] [place] [id number]    Surgeon:  Andres Turner MD  Scrub:  Kiersten Bautista RN    INDICATION:    Preoperative Diagnosis: primary basal cell carcinoma  Location: R jaw line  Postoperative Diagnosis: same  Preoperative Lesion size: 1.8 x 1.6 cm    After appropriate discussion and informed consent for Mohs surgery and possible repair of the Mohs surgery defect, the patient underwent Mohs surgery as follows:    STAGE I:  The patient was placed on the operating room table.  The area was cleansed with chlorhexidine and infiltrated with 1% Lidocaine and epinephrine. Tumor was debulked. Using a #15-blade, complete excision was made around the tumor in 1 section.  Hemostasis was obtained by electrodesiccation.  A dressing was placed.  Tissue was divided into 1 tissue block that was subsequently mapped, color coded and processed in the Mohs Laboratory.  Microscopic tumor was not found in the tissue block.    With the lesion clear of micrographic tumor, surgery was considered complete.  The defect extended to the fat and measured 2.0 x 1.8 cm.    RECONSTRUCTIVE DERMATOLOGIC SURGERY REPORT  We discussed the options for wound management in full with the patient including risks/benefits/possible outcomes.     Complex Layered Linear Closure:  Because of the size and full thickness nature of the defect and tightness of the surrounding skin, a complex closure was planned. Patient was prepped with Betasept, local anesthesia administered, and draped in a sterile fashion. The Mohs defect/wound was  circumferentially debeveled. Burow's triangles were excised in the relaxed skin tension lines from opposite ends of the defect, oriented in relaxed skin tension lines, and the wound edges were widely undermined by dissection in the subcutaneous plane until adequate tissue mobility had been obtained and tissue could be cosmetically re-draped. Hemostasis was obtained. Tension reduction sutures placed at the dermal subcutaneous plane at the margins of undermining. The wound edges were then advanced and closed in a layered fashion using deep intradermal 4-0 Monocryl and superficial running 5-0 fast absorbing gut sutures. Postoperative length was 3.9 cm.     Estimated blood loss, minimal; complications, none; bandaging and wound care, routine. Patient was given written and verbal wound care procedures prior to discharge. Patient was discharged in good condition and will return for evaluation as needed.      Staff Involved:    Scribe Disclosure  I, Ignacio Ryan, am serving as a scribe to document services personally performed by Dr. Andres Turner, based on data collection and the provider's statements to me.     Attending attestation:  I personally performed the entire procedure.  I have reviewed the note and edited it as necessary, and agree with its contents.    Andres Turner M.D.    Director of Dermatologic Surgery  Department of Dermatology  [facility]    Dermatology Surgery Clinic  [facility] [place] [address]          MOHS MICROGRAPHIC SURGERY REPORT   November 1, 2018    Dermatology Surgery Clinic  [place] [id number]    Surgeon:  Andres Turner MD  Scrub:  Kiersten Bautista RN    INDICATION:    Preoperative Diagnosis: primary basal cell carcinoma  Location: R nose  Postoperative Diagnosis: same  Preoperative Lesion size: 0.7 x 1.0  cm    After appropriate discussion and informed consent for Mohs surgery and possible repair of the Mohs surgery defect, the patient underwent Mohs surgery as follows:    STAGE I:  The patient was placed on the operating room table.  The area was cleansed with chlorhexidine and infiltrated with 1% Lidocaine and epinephrine. Tumor was debulked. Using a #15-blade, complete excision was made around the tumor in 1 section.  Hemostasis was obtained by electrodesiccation.  A dressing was placed.  Tissue was divided into 1 tissue block that was subsequently mapped, color coded and processed in the Mohs Laboratory.  Microscopic tumor was not found in the tissue block.    With the lesion clear of micrographic tumor, surgery was considered complete.  The defect extended to the fascia and measured 1.1 x 1.2 cm.    RECONSTRUCTIVE DERMATOLOGIC SURGERY REPORT  We discussed the options for wound management in full with the patient including risks/benefits/possible outcomes.     Repair by Rhombic Transposition for Nasal Defect   Because of the size and full-thickness nature of the defect, and in order to maintain form and function while avoiding distortion of the nearby nasal tip and ala, a rhombic transposition flap was planned. After anesthesia and prep, the rhombic flap was carefully incised superior to the defect; Burow's triangle was excised laterally, just superior to the alar groove. The flap was raised and the wound edges were all undermined in the subcutaneous plane. After hemostasis, the flap was transposed into the defect and sutured in a layered fashion using deep intradermal 5-0 Monocryl and superficial running 5-0 fast absorbing gut sutures. Postoperative size was 2.0 x 1.7 cm.    Estimated blood loss, minimal; complications, none; bandaging and wound care, routine. Total anesthesia 7.0 ml for both sites. Patient was given written and verbal wound care procedures prior to discharge. Patient was discharged in good  condition and will return for bandage change/suture removal in 1 week and for assessment of surgical repair in 3 months as desired.       Staff Involved:    Scribe Disclosure  I, Ignacio Ryan, am serving as a scribe to document services personally performed by Dr. Andres Turner, based on data collection and the provider's statements to me.     Attending attestation:  I personally performed the entire procedure.  I have reviewed the note and edited it as necessary, and agree with its contents.    Andres Turner M.D.    Director of Dermatologic Surgery  Department of Dermatology  [facility]    Dermatology Surgery Clinic  [facility]  [address] [place], [place] [id number]

## 2018-11-01 NOTE — PATIENT INSTRUCTIONS
For the First 48 hours After Surgery:  1. Leave the pressure bandage on and keep it dry. If it should come loose, you may retape it, but do not take it off.  2. Relax and take it easy. Do not do any vigorous exercise, heavy lifting, or bending forward. This could cause the wound to bleed.  3. Post-operative pain is usually mild. You may take plain or extra strength Tylenol every 4 hours as needed (do not take more than 4,000mg in one day). Do not take any medicine that contains aspirin, ibuprofen or motrin unless you have been recommended these by a doctor.  Avoid alcohol and vitamin E as these may increase your tendency to bleed.  4. You may put an ice pack around the bandaged area for 20 minutes every 2-3 hours. This may help reduce swelling, bruising, and pain. Make sure the ice pack is waterproof so that the pressure bandage does not get wet.   5. You may see a small amount of drainage or blood on your pressure bandage. This is normal. However, if drainage or bleeding continues or saturates the bandage, you will need to apply firm pressure over the bandage with a washcloth for 15 minutes. If bleeding continues after applying pressure for 15 minutes then go to the nearest emergency room.  48 Hours After Surgery  Carefully remove the bandage and start daily wound care and dressing changes. You may also now shower and get the wound wet. Wash wound with a mild soap and water.  Use caution when washing the wound. Be gentle and do not let the forceful shower stream hit the wound directly.  PAT dry.  Daily Wound Care:  1. Wash wound with a mild soap and water.  Use caution when washing the wound, be gentle and do not let the forceful shower stream hit the wound directly.  2. PAT DRY.  3. Apply Vaseline (from a new container or tube) over the suture line with a Q-tip. It is very important to keep the wound continuously moist, as wounds heal best in a moist environment.  4.  Keep the site covered until sutures are  removed, you can cover it with a Telfa (non-stick) dressing and tape or a band-aid.    5. If you are unable to keep wound covered, you must apply Vaseline every 2 - 3 hours (while awake) to ensure it is being kept moist for optimal healing. A dressing overnight is recommended to keep the area moist.   Call Us If:  1. You have pain that is not controlled with Tylenol.  2. You have signs or symptoms of an infection, such as: fever over 100 degrees F, redness, warmth, or foul-smelling or yellow/creamy drainage from the wound.  Who should I call with questions?    [facility]: [phone]     [facility]: [phone]    For urgent needs outside of business hours call the [facility] at [phone] and ask for the dermatology resident on call

## 2018-11-01 NOTE — PROGRESS NOTES
MOHS MICROGRAPHIC SURGERY REPORT   November 1, 2018    Dermatology Surgery Clinic  [facility]  [address] [place] [address]    Surgeon:  Andres Turner MD  Scrub:  Kiersten Bautista RN    INDICATION:    Preoperative Diagnosis: primary basal cell carcinoma  Location: R nose  Postoperative Diagnosis: same  Preoperative Lesion size: 0.7 x 1.0 cm    After appropriate discussion and informed consent for Mohs surgery and possible repair of the Mohs surgery defect, the patient underwent Mohs surgery as follows:    STAGE I:  The patient was placed on the operating room table.  The area was cleansed with chlorhexidine and infiltrated with 1% Lidocaine and epinephrine. Tumor was debulked. Using a #15-blade, complete excision was made around the tumor in 1 section.  Hemostasis was obtained by electrodesiccation.  A dressing was placed.  Tissue was divided into 1 tissue block that was subsequently mapped, color coded and processed in the Mohs Laboratory.  Microscopic tumor was not found in the tissue block.    With the lesion clear of micrographic tumor, surgery was considered complete.  The defect extended to the fascia and measured 1.1 x 1.2 cm.    RECONSTRUCTIVE DERMATOLOGIC SURGERY REPORT  We discussed the options for wound management in full with the patient including risks/benefits/possible outcomes.     Repair by Rhombic Transposition for Nasal Defect   Because of the size and full-thickness nature of the defect, and in order to maintain form and function while avoiding distortion of the nearby nasal tip and ala, a rhombic transposition flap was planned. After anesthesia and prep, the rhombic flap was carefully incised superior to the defect; Burow's triangle was excised laterally, just superior to the alar groove. The flap was raised and the wound edges were all undermined in the subcutaneous plane. After hemostasis, the flap was transposed into the defect and sutured  in a layered fashion using deep intradermal 5-0 Monocryl and superficial running 5-0 fast absorbing gut sutures. Postoperative size was 2.0 x 1.7 cm.    Estimated blood loss, minimal; complications, none; bandaging and wound care, routine. Total anesthesia 7.0 ml for both sites. Patient was given written and verbal wound care procedures prior to discharge. Patient was discharged in good condition and will return for bandage change/suture removal in 1 week and for assessment of surgical repair in 3 months as desired.       Staff Involved:    Scribe Disclosure  I, Ignacio Ryan, am serving as a scribe to document services personally performed by Dr. Andres Turner, based on data collection and the provider's statements to me.     Attending attestation:  I personally performed the entire procedure.  I have reviewed the note and edited it as necessary, and agree with its contents.    Andres Turner M.D.    Director of Dermatologic Surgery  Department of Dermatology  [facility]    Dermatology Surgery Clinic  [facility] and Surgery [address] [address]

## 2018-11-01 NOTE — NURSING NOTE
Closure performed on the right nose. Injected 2.5 ml of Xylocaine  (Lidocaine 1% and Epinephrine  (1:100,000))    Defect Photo: SK  Closure Photo: Sk    Present for procedure: MD Ronel Mckenzie LPN Jazmine Olson- Medical Student    Closure performed on the right jawline. Injected 4.5 ml of Xylocaine  (Lidocaine 1% and Epinephrine  (1:100,000))    Defect Photo: SK  Closure Photo: Sk    Present for procedure: MD Ronel Mckenzie LPN Jazmine Olson- Medical Student

## 2018-11-01 NOTE — NURSING NOTE
Chief Complaint   Patient presents with     Derm Problem     Lupillo is here today for MOHS of the right jawline and right nose     Kiersten Bautista RN

## 2018-11-01 NOTE — NURSING NOTE
Vaseline and telfa applied. Pressure dressing applied with dental rolls and tape. No bleeding noted. Denies pain.  Wound care instructions reviewed. Supplies given. All questions answered.

## 2018-11-01 NOTE — NURSING NOTE
1st layer performed on the right nose. Injected 2.5 ml of Xylocaine  (Lidocaine 1% and Epinephrine  (1:100,000))      Present for procedure: MD Kiersten Mckenzie, DEBI Ward    1st layer performed on the right jawline. Injected 5.5 ml of Xylocaine  (Lidocaine 1% and Epinephrine  (1:100,000))      Present for procedure: MD Kiersten Mckenzie, DEBI Ward

## 2018-11-01 NOTE — MR AVS SNAPSHOT
After Visit Summary   11/1/2018    Lupillo Murguia    MRN: [id number]           Patient Information     Date Of Birth          1950        Visit Information        Provider Department      11/1/2018 7:30 AM Andres Turner MD [facility] Dermatologic Surgery        Today's Diagnoses     Basal cell carcinoma of jawline    -  1    Basal cell carcinoma of right side of nose          Care Instructions    For the First 48 hours After Surgery:  1. Leave the pressure bandage on and keep it dry. If it should come loose, you may retape it, but do not take it off.  2. Relax and take it easy. Do not do any vigorous exercise, heavy lifting, or bending forward. This could cause the wound to bleed.  3. Post-operative pain is usually mild. You may take plain or extra strength Tylenol every 4 hours as needed (do not take more than 4,000mg in one day). Do not take any medicine that contains aspirin, ibuprofen or motrin unless you have been recommended these by a doctor.  Avoid alcohol and vitamin E as these may increase your tendency to bleed.  4. You may put an ice pack around the bandaged area for 20 minutes every 2-3 hours. This may help reduce swelling, bruising, and pain. Make sure the ice pack is waterproof so that the pressure bandage does not get wet.   5. You may see a small amount of drainage or blood on your pressure bandage. This is normal. However, if drainage or bleeding continues or saturates the bandage, you will need to apply firm pressure over the bandage with a washcloth for 15 minutes. If bleeding continues after applying pressure for 15 minutes then go to the nearest emergency room.  48 Hours After Surgery  Carefully remove the bandage and start daily wound care and dressing changes. You may also now shower and get the wound wet. Wash wound with a mild soap and water.  Use caution when washing the wound. Be gentle and do not let the forceful shower stream hit the wound directly.  PAT dry.  Daily Wound  Care:  1. Wash wound with a mild soap and water.  Use caution when washing the wound, be gentle and do not let the forceful shower stream hit the wound directly.  2. PAT DRY.  3. Apply Vaseline (from a new container or tube) over the suture line with a Q-tip. It is very important to keep the wound continuously moist, as wounds heal best in a moist environment.  4.  Keep the site covered until sutures are removed, you can cover it with a Telfa (non-stick) dressing and tape or a band-aid.    5. If you are unable to keep wound covered, you must apply Vaseline every 2 - 3 hours (while awake) to ensure it is being kept moist for optimal healing. A dressing overnight is recommended to keep the area moist.   Call Us If:  1. You have pain that is not controlled with Tylenol.  2. You have signs or symptoms of an infection, such as: fever over 100 degrees F, redness, warmth, or foul-smelling or yellow/creamy drainage from the wound.  Who should I call with questions?    [facility]: [phone]     [facility]: [phone]    For urgent needs outside of business hours call the [facility] at [phone] and ask for the dermatology resident on call              Follow-ups after your visit        Your next 10 appointments already scheduled     Nov 12, 2018  7:30 AM CST   (Arrive by 7:15 AM)   Return Mohs with Andres Turner MD   [facility] Dermatologic Surgery ([facility] [place])    [address]  3rd Floor  [place] [place] [facility] [address] [phone]            Sep 24, 2019 11:00 AM CDT   (Arrive by 10:45 AM)   Return Liver Transplant with Le Simmons MD   [facility] Hepatology ([facility])    [address]  [address]  [place] [address] [phone]              Who to contact     Please call your clinic at [phone] to:    Ask questions about your health    Make or cancel  appointments    Discuss your medicines    Learn about your test results    Speak to your doctor            Additional Information About Your Visit        Care EveryWhere ID     This is your Care EveryWhere ID. This could be used by other organizations to access your [place] medical records  [id number]        Your Vitals Were     Pulse                   79            Blood Pressure from Last 3 Encounters:   11/01/18 141/88   09/25/18 164/80   10/10/17 (!) 154/94    Weight from Last 3 Encounters:   09/25/18 82.5 kg (181 lb 12.8 oz)   09/26/17 79.2 kg (174 lb 9.6 oz)   04/13/17 74.8 kg (165 lb)              Today, you had the following     No orders found for display       Primary Care Provider Office Phone # Fax #    Joaquin Haines [phone]       [facility] [address] [place] [address]        Equal Access to Services     [facility]: Hadii aad ku hadasho Soomaali, waaxda luqadaha, qaybta kaalmada adeegyada, waxay gurvinderin haymalcolmn kayleen hunter . So [facility] [phone].    ATENCIÓN: Si habla español, tiene a saleh disposición servicios gratuitos de asistencia lingüística. Llame al [phone].    We comply with applicable federal civil rights laws and [place] laws. We do not discriminate on the basis of race, color, national origin, age, disability, sex, sexual orientation, or gender identity.            Thank you!     Thank you for choosing [facility] DERMATOLOGIC SURGERY  for your care. Our goal is always to provide you with excellent care. Hearing back from our patients is one way we can continue to improve our services. Please take a few minutes to complete the written survey that you may receive in the mail after your visit with us. Thank you!             Your Updated Medication List - Protect others around you: Learn how to safely use, store and throw away your medicines at www.disposemymeds.org.          This list is accurate as of 11/1/18 10:58 AM.   Always use your most recent med list.                   Brand Name Dispense Instructions for use Diagnosis    amLODIPine 5 MG tablet    NORVASC     Take 5 mg by mouth daily.        ASPIRIN PO      Take 325 mg by mouth daily        fluticasone 110 MCG/ACT Inhaler    FLOVENT HFA     Inhale 2 puffs into the lungs 2 times daily    Liver replaced by transplant (H)       metoprolol tartrate 25 MG tablet    LOPRESSOR     Take 25 mg by mouth daily        tacrolimus 0.5 MG capsule    GENERIC EQUIVALENT    180 capsule    Take 3 capsules (1.5 mg) by mouth every 12 hours    Liver replaced by transplant (H)       ursodiol 300 MG capsule    ACTIGALL    60 capsule    TAKE ONE CAPSULE BY MOUTH TWICE A DAY    Liver replaced by transplant (H)       VENTOLIN  (90 Base) MCG/ACT inhaler   Generic drug:  albuterol      Inhale 2 puffs into the lungs every 6 hours.

## 2018-12-21 ENCOUNTER — TELEPHONE (OUTPATIENT)
Dept: TRANSPLANT | Facility: CLINIC | Age: 68
End: 2018-12-21

## 2019-01-04 DIAGNOSIS — Z94.4 LIVER REPLACED BY TRANSPLANT (H): ICD-10-CM

## 2019-01-04 LAB
ALBUMIN SERPL-MCNC: 3.7 G/DL (ref 3.4–5)
ALBUMIN UR-MCNC: NEGATIVE MG/DL
ALP SERPL-CCNC: 77 U/L (ref 40–150)
ALT SERPL W P-5'-P-CCNC: 24 U/L (ref 0–70)
ANION GAP SERPL CALCULATED.3IONS-SCNC: 7 MMOL/L (ref 3–14)
APPEARANCE UR: CLEAR
AST SERPL W P-5'-P-CCNC: 19 U/L (ref 0–45)
BILIRUB DIRECT SERPL-MCNC: 0.2 MG/DL (ref 0–0.2)
BILIRUB SERPL-MCNC: 0.7 MG/DL (ref 0.2–1.3)
BILIRUB UR QL STRIP: NEGATIVE
BUN SERPL-MCNC: 15 MG/DL (ref 7–30)
CALCIUM SERPL-MCNC: 8.8 MG/DL (ref 8.5–10.1)
CHLORIDE SERPL-SCNC: 104 MMOL/L (ref 94–109)
CHOLEST SERPL-MCNC: 143 MG/DL
CO2 SERPL-SCNC: 24 MMOL/L (ref 20–32)
COLOR UR AUTO: YELLOW
CREAT SERPL-MCNC: 0.88 MG/DL (ref 0.66–1.25)
CREAT UR-MCNC: 54 MG/DL
ERYTHROCYTE [DISTWIDTH] IN BLOOD BY AUTOMATED COUNT: 13.4 % (ref 10–15)
GFR SERPL CREATININE-BSD FRML MDRD: 88 ML/MIN/{1.73_M2}
GLUCOSE SERPL-MCNC: 88 MG/DL (ref 70–99)
GLUCOSE UR STRIP-MCNC: NEGATIVE MG/DL
HCT VFR BLD AUTO: 50.9 % (ref 40–53)
HDLC SERPL-MCNC: 34 MG/DL
HGB BLD-MCNC: 16.8 G/DL (ref 13.3–17.7)
HGB UR QL STRIP: NEGATIVE
KETONES UR STRIP-MCNC: NEGATIVE MG/DL
LDLC SERPL CALC-MCNC: 79 MG/DL
LEUKOCYTE ESTERASE UR QL STRIP: NEGATIVE
MCH RBC QN AUTO: 31.2 PG (ref 26.5–33)
MCHC RBC AUTO-ENTMCNC: 33 G/DL (ref 31.5–36.5)
MCV RBC AUTO: 95 FL (ref 78–100)
NITRATE UR QL: NEGATIVE
NONHDLC SERPL-MCNC: 110 MG/DL
PH UR STRIP: 6 PH (ref 5–7)
PLATELET # BLD AUTO: 196 10E9/L (ref 150–450)
POTASSIUM SERPL-SCNC: 3.7 MMOL/L (ref 3.4–5.3)
PROT SERPL-MCNC: 8.2 G/DL (ref 6.8–8.8)
PROT UR-MCNC: 0.27 G/L
PROT/CREAT 24H UR: 0.5 G/G CR (ref 0–0.2)
RBC # BLD AUTO: 5.38 10E12/L (ref 4.4–5.9)
RBC #/AREA URNS AUTO: 2 /HPF (ref 0–2)
SODIUM SERPL-SCNC: 135 MMOL/L (ref 133–144)
SOURCE: NORMAL
SP GR UR STRIP: 1.01 (ref 1–1.03)
TACROLIMUS BLD-MCNC: 7.2 UG/L (ref 5–15)
TME LAST DOSE: NORMAL H
TRIGL SERPL-MCNC: 152 MG/DL
UROBILINOGEN UR STRIP-MCNC: 0 MG/DL (ref 0–2)
WBC # BLD AUTO: 9.9 10E9/L (ref 4–11)
WBC #/AREA URNS AUTO: 1 /HPF (ref 0–5)

## 2019-01-04 PROCEDURE — 80197 ASSAY OF TACROLIMUS: CPT | Performed by: INTERNAL MEDICINE

## 2019-01-07 ENCOUNTER — TELEPHONE (OUTPATIENT)
Dept: TRANSPLANT | Facility: CLINIC | Age: 69
End: 2019-01-07

## 2019-01-07 DIAGNOSIS — Z94.4 LIVER REPLACED BY TRANSPLANT (H): ICD-10-CM

## 2019-01-07 RX ORDER — TACROLIMUS 0.5 MG/1
1 CAPSULE ORAL EVERY 12 HOURS
Qty: 120 CAPSULE | Refills: 11 | Status: SHIPPED | OUTPATIENT
Start: 2019-01-07 | End: 2019-07-25

## 2019-02-27 ENCOUNTER — TELEPHONE (OUTPATIENT)
Dept: DERMATOLOGY | Facility: CLINIC | Age: 69
End: 2019-02-27

## 2019-02-27 NOTE — TELEPHONE ENCOUNTER
I called the patient and reminded him that he has 2 sites that have not yet been treated. I set the patient up for MOHS in April.   Enma Morataya CMA

## 2019-03-22 DIAGNOSIS — Z94.4 LIVER REPLACED BY TRANSPLANT (H): ICD-10-CM

## 2019-03-22 LAB
ALBUMIN SERPL-MCNC: 3.7 G/DL (ref 3.4–5)
ALP SERPL-CCNC: 64 U/L (ref 40–150)
ALT SERPL W P-5'-P-CCNC: 16 U/L (ref 0–70)
ANION GAP SERPL CALCULATED.3IONS-SCNC: 6 MMOL/L (ref 3–14)
AST SERPL W P-5'-P-CCNC: 15 U/L (ref 0–45)
BILIRUB DIRECT SERPL-MCNC: 0.2 MG/DL (ref 0–0.2)
BILIRUB SERPL-MCNC: 0.7 MG/DL (ref 0.2–1.3)
BUN SERPL-MCNC: 15 MG/DL (ref 7–30)
CALCIUM SERPL-MCNC: 8.7 MG/DL (ref 8.5–10.1)
CHLORIDE SERPL-SCNC: 103 MMOL/L (ref 94–109)
CO2 SERPL-SCNC: 28 MMOL/L (ref 20–32)
CREAT SERPL-MCNC: 0.92 MG/DL (ref 0.66–1.25)
ERYTHROCYTE [DISTWIDTH] IN BLOOD BY AUTOMATED COUNT: 13.7 % (ref 10–15)
GFR SERPL CREATININE-BSD FRML MDRD: 85 ML/MIN/{1.73_M2}
GLUCOSE SERPL-MCNC: 83 MG/DL (ref 70–99)
HCT VFR BLD AUTO: 51.3 % (ref 40–53)
HGB BLD-MCNC: 17.2 G/DL (ref 13.3–17.7)
MCH RBC QN AUTO: 32.3 PG (ref 26.5–33)
MCHC RBC AUTO-ENTMCNC: 33.5 G/DL (ref 31.5–36.5)
MCV RBC AUTO: 96 FL (ref 78–100)
PLATELET # BLD AUTO: 210 10E9/L (ref 150–450)
POTASSIUM SERPL-SCNC: 3.6 MMOL/L (ref 3.4–5.3)
PROT SERPL-MCNC: 7.3 G/DL (ref 6.8–8.8)
RBC # BLD AUTO: 5.32 10E12/L (ref 4.4–5.9)
SODIUM SERPL-SCNC: 138 MMOL/L (ref 133–144)
TACROLIMUS BLD-MCNC: 4.9 UG/L (ref 5–15)
TME LAST DOSE: ABNORMAL H
WBC # BLD AUTO: 14.2 10E9/L (ref 4–11)

## 2019-03-22 PROCEDURE — 80197 ASSAY OF TACROLIMUS: CPT | Performed by: INTERNAL MEDICINE

## 2019-04-08 ENCOUNTER — OFFICE VISIT (OUTPATIENT)
Dept: DERMATOLOGY | Facility: CLINIC | Age: 69
End: 2019-04-08
Payer: MEDICARE

## 2019-04-08 VITALS — DIASTOLIC BLOOD PRESSURE: 93 MMHG | SYSTOLIC BLOOD PRESSURE: 136 MMHG | HEART RATE: 75 BPM

## 2019-04-08 DIAGNOSIS — C44.319 BASAL CELL CARCINOMA OF LEFT CHEEK: ICD-10-CM

## 2019-04-08 DIAGNOSIS — D48.5 NEOPLASM OF UNCERTAIN BEHAVIOR OF SKIN: Primary | ICD-10-CM

## 2019-04-08 DIAGNOSIS — C44.319 BASAL CELL CARCINOMA OF LEFT FOREHEAD: ICD-10-CM

## 2019-04-08 PROCEDURE — 88305 TISSUE EXAM BY PATHOLOGIST: CPT | Mod: TC | Performed by: DERMATOLOGY

## 2019-04-08 PROCEDURE — 88312 SPECIAL STAINS GROUP 1: CPT | Mod: TC | Performed by: DERMATOLOGY

## 2019-04-08 ASSESSMENT — PAIN SCALES - GENERAL
PAINLEVEL: NO PAIN (0)
PAINLEVEL: NO PAIN (0)

## 2019-04-08 NOTE — NURSING NOTE
The surgical sites were both dressed with Vaseline, Telfa and a pressure dressing. Wound care was gone over with the patient, all questions were answered.   Enma Morataya CMA

## 2019-04-08 NOTE — NURSING NOTE
Chief Complaint   Patient presents with     Skin Cancer     Mr. Murguia is here today to have MOHs on the left forehead and the right lower cheek for BCC.      Enma Morataya CMA

## 2019-04-08 NOTE — PATIENT INSTRUCTIONS
Wound Care After a Biopsy    What is a skin biopsy?  A skin biopsy allows the doctor to examine a very small piece of tissue under the microscope to determine the diagnosis and the best treatment for the skin condition. A local anesthetic (numbing medicine)  is injected with a very small needle into the skin area to be tested. A small piece of skin is taken from the area. Sometimes a suture (stitch) is used.     What are the risks of a skin biopsy?  I will experience scar, bleeding, swelling, pain, crusting and redness. I may experience incomplete removal or recurrence. Risks of this procedure are excessive bleeding, bruising, infection, nerve damage, numbness, thick (hypertrophic or keloidal) scar and non-diagnostic biopsy.    How should I care for my wound for the first 24 hours?    Keep the wound dry and covered for 24 hours    If it bleeds, hold direct pressure on the area for 15 minutes. If bleeding does not stop then go to the emergency room    Avoid strenuous exercise the first 1-2 days or as your doctor instructs you    How should I care for the wound after 24 hours?    After 24 hours, remove the bandage    You may bathe or shower as normal    If you had a scalp biopsy, you can shampoo as usual and can use shower water to clean the biopsy site daily    Clean the wound twice a day with gentle soap and water    Do not scrub, be gentle    Apply white petroleum/Vaseline after cleaning the wound with a cotton swab or a clean finger, and keep the site covered with a Bandaid /bandage. Bandages are not necessary with a scalp biopsy    If you are unable to cover the site with a Bandaid /bandage, re-apply ointment 2-3 times a day to keep the site moist. Moisture will help with healing    Avoid strenuous activity for first 1-2 days    Avoid lakes, rivers, pools, and oceans until the stitches are removed or the site is healed    How do I clean my wound?    Wash hands thoroughly with soap or use hand  before all  wound care    Clean the wound with gentle soap and water    Apply white petroleum/Vaseline  to wound after it is clean    Replace the Bandaid /bandage to keep the wound covered for the first few days or as instructed by your doctor    If you had a scalp biopsy, warm shower water to the area on a daily basis should suffice    What should I use to clean my wound?     Cotton-tipped applicators (Qtips )    White petroleum jelly (Vaseline ). Use a clean new container and use Q-tips to apply.    Bandaids   as needed    Gentle soap     How should I care for my wound long term?    Do not get your wound dirty    Keep up with wound care for one week or until the area is healed.    A small scab will form and fall off by itself when the area is completely healed. The area will be red and will become pink in color as it heals. Sun protection is very important for how your scar will turn out. Sunscreen with an SPF 30 or greater is recommended once the area is healed.    You should have some soreness but it should be mild and slowly go away over several days. Talk to your doctor about using tylenol for pain,    When should I call my doctor?  If you have increased:     Pain or swelling    Pus or drainage (clear or slightly yellow drainage is ok)    Temperature over 100F    Spreading redness or warmth around wound    When will I hear about my results?  The biopsy results can take 2-3 weeks to come back. The clinic will call you with the results, send you a [employer] message, or have you schedule a follow-up clinic or phone time to discuss the results. Contact our clinics if you do not hear from us in 3 weeks.     Who should I call with questions?    [facility]: [phone]     [facility]: [phone]    For urgent needs outside of business hours call the [facility] at [phone] and ask for the dermatology resident on call  Excision Wound Care Instructions  I  will experience scar, altered skin color, bleeding, swelling, pain, crusting and redness. I may experience altered sensation. Risks are excessive bleeding, infection, muscle weakness, thick (hypertrophic or keloidal) scar, and recurrence,. A second procedure may be recommended to obtain the best cosmetic or functional result.  Possible complications of any surgical procedure are bleeding, infection, scarring, alteration in skin color and sensation, muscle weakness in the area, wound dehiscence or seperation, or recurrence of the lesion or disease. On occasion, after healing, a secondary procedure or revision may be recommended in order to obtain the best cosmetic or functional result.   After your surgery, a pressure bandage will be placed over the area that has sutures. This will help prevent bleeding. Please follow these instructions, as they will help you to prevent complications as your wound heals.  For the First 48 hours After Surgery:  1. Leave the pressure bandage on and keep it dry. If it should come loose, you may retape it, but do not take it off.  2. Relax and take it easy. Do not do any vigorous exercise, heavy lifting, or bending forward. This could cause the wound to bleed.  3. Post-operative pain is usually mild. You may take plain or extra strength Tylenol every 4 hours as needed (do not take more than 4,000mg in one day). Do not take any medicine that contains aspirin, ibuprofen or motrin unless you have been recommended these by a doctor.  Avoid alcohol and vitamin E as these may increase your tendency to bleed.  4. You may put an ice pack around the bandaged area for 20 minutes every 2-3 hours. This may help reduce swelling, bruising, and pain. Make sure the ice pack is waterproof so that the pressure bandage does not get wet.   5. You may see a small amount of drainage or blood on your pressure bandage. This is normal. However, if drainage or bleeding continues or saturates the bandage, you will  need to apply firm pressure over the bandage with a washcloth for 15 minutes. If bleeding continues after applying pressure for 15 minutes then go to the nearest emergency room.  48 Hours After Surgery  Carefully remove the bandage and start daily wound care and dressing changes. You may also now shower and get the wound wet. Wash wound with a mild soap and water.  Use caution when washing the wound. Be gentle and do not let the forceful shower stream hit the wound directly.  PAT dry.  Daily Wound Care:  1. Wash wound with a mild soap and water.  Use caution when washing the wound, be gentle and do not let the forceful shower stream hit the wound directly.  2. PAT DRY.  3. Apply Vaseline (from a new container or tube) over the suture line with a Q-tip. It is very important to keep the wound continuously moist, as wounds heal best in a moist environment.  4.  Keep the site covered until sutures are removed, you can cover it with a Telfa (non-stick) dressing and tape or a band-aid.    5. If you are unable to keep wound covered, you must apply Vaseline every 2 - 3 hours (while awake) to ensure it is being kept moist for optimal healing. A dressing overnight is recommended to keep the area moist.   Call Us If:  1. You have pain that is not controlled with Tylenol.  2. You have signs or symptoms of an infection, such as: fever over 100 degrees F, redness, warmth, or foul-smelling or yellow/creamy drainage from the wound.  Who should I call with questions?    [facility]: [phone]     [facility]: [phone]    For urgent needs outside of business hours call the [facility] at [phone] and ask for the dermatology resident on call

## 2019-04-08 NOTE — PROGRESS NOTES
[facility] Dermatologic Surgery Procedure Note    Dermatology Surgery Clinic  [facility]  Cl[facility]ics and [facility] Center  [address] [place] [address]    Date of Service:  Apr 8, 2019  Surgery: Mohs micrographic surgery    Surgeon: Yue  Assistant Surgeon: Anabell    Case 1  Repair Type: complex  Repair Size: 5.2 cm  Suture Material: Monocryl 4-0; Fast Absorbing Gut 5-0  Tumor Type: BCC - Basal cell carcinoma  Location: Left forehead  Derm-Path Accession #: [id number]  PreOp Size: 0.6 x 0.8 cm  PostOp Size: 1.9 x 1.6 cm  Mohs Accession #:  [id number]  Level of Defect: fat    Procedure:  We discussed the principles of treatment and most likely complications including scarring, bleeding, infection, swelling, pain, crusting, nerve damage, large wound,  incomplete excision, wound dehiscence,  nerve damage, recurrence, and a second procedure may be recommended to obtain the best cosmetic or functional result.    Informed consent was obtained and the patient underwent the procedure as follows:  The patient was placed supine on the operating table.  The cancer was identified, outlined with a marker, and verified by the patient.  The entire surgical field was prepped with Hibiclens.  The surgical site was anesthetized using Lidocaine 1% with epi 1:100,000.    The area of clinically apparent tumor was not debulked. The layer of tissue was then surgically excised using a #15 blade and was then transferred onto a specimen sheet maintaining the orientation of the specimen. Hemostasis was obtained using heat cautery. The wound site was then covered with a dressing while the tissue samples were processed for examination.    The excised tissue was transported to the Mohs histology laboratory maintaining the tissue orientation.  The tissue specimen was relaxed so that the entire surgical margin was in a a single horizontal plane for sectioning and inked for precise mapping.  A  precise reference map was drawn to reflect the sectioning of the specimen, colored inking of the margins, and orientation on the patient.  The tissue was processed using horizontal sectioning of the base and continuous peripheral margins.  The histopathologic sections were reviewed in conjunction with the reference map.    Total blocks: 1    Total slides:  2    Residual tumor was identified and indicated in red on the reference map, identifying the location where further tissue excision was necessary. Therefore, an additional stage of Mohs Micrographic surgery was deemed necessary.     Stage II   The patient was returned to the operating room, and the area prepped in the usual manner. The residual tumor was excised using the reference map as a guide. The specimen was transfered to a labeled specimen sheet maintaining the orientation of the specimen. Hemostasis was obtained and the wound site was covered with a dressing while the tissue was processed for examination.     The excised tissue was transported to the Mohs histology laboratory maintaining orientation. The specimen margins were inked for precise mapping and a reference map was prepared for the is additional stage to maintain precise orientation as described above. The tissue was processed using horizontal sectioning of the base and continuous peripheral margins. The histopathologic sections were reviewed in conjunction with the reference map.     Total blocks: 1    Total slides:  2    There were no cancer cells visualized on examination, therefore Mohs surgery was complete.       Reconstruction: Complex Closure    The patient was taken to the operative suite and placed supine on the operating room table.  The defect was identified.  Appropriate markings were made with a marking pen to plan the repair.  The area was infiltrated with Lidocaine 1% with epi 1:100,000 and prepped with Hibiclens and draped with sterile towels.     The wound was debeveled and  undermined widely.  Cones were excised within relaxed skin tension lines on both sides of the defect.  Hemostasis was obtained using electrocoagulation. The wound was narrowed with SMAS placation and the dermis and subcutaneous tissue were then approximated using 4-0 Monocryl buried vertical mattress sutures.  The wound edges were then approximated additional buried sutures were placed in a similar fashion where needed.  Percutaneous 5-0 fast absorbing gut sutures were carefully placed for maximum eversion and meticulous approximation.    Repair Size: 5.2cm  Anesthetic Amount Used: 5.5     The wound was cleansed with saline and ointment was applied along the wound surface.     A sterile pressure dressing was applied.  Wound care instructions were given verbally and in writing.  The patient left the operating suite in stable condition.  Patient was informed that additional refinement of the resulting surgical scar may be used as a second stage of this reconstruction.     The Attending surgeon was present for key portions of the procedure and always immediately available.    Staff Involved:    Scribe Disclosure  I, Ignacio Ryan, am serving as a scribe to document services personally performed by Dr. Andres Turner, based on data collection and the provider's statements to me.     Attending attestation:  I personally performed the entire procedure.  I have reviewed the note and edited it as necessary, and agree with its contents.    Andres Turner M.D.    Director of Dermatologic Surgery  Department of Dermatology  [facility]    Dermatology Surgery Clinic  [facility]  [address] [place] [address]

## 2019-04-08 NOTE — LETTER
4/8/2019       RE: Lupillo Murguia  [address]  [place] [address]     Dear Colleague,    Thank you for referring your patient, Lupillo Murguia, to the [facility] DERMATOLOGIC SURGERY at [facility]. Please see a copy of my visit note below.    [facility] Mohs Dermatologic Surgery Procedure Note    Dermatology Surgery Clinic  [facility]  Clinics and Surgery Center  [address] [place] [id number]    Date of Service:  Apr 8, 2019  Surgery: Mohs micrographic surgery    Surgeon: Yue  Assistant Surgeon: Anabell    Case 1  Repair Type: complex  Repair Size: 5.2 cm  Suture Material: Monocryl 4-0; Fast Absorbing Gut 5-0  Tumor Type: BCC - Basal cell carcinoma  Location: Left forehead  Derm-Path Accession #: [id number]  PreOp Size: 0.6 x 0.8 cm  PostOp Size: 1.9 x 1.6 cm  Mohs Accession #:  IM  Level of Defect: fat    Procedure:  We discussed the principles of treatment and most likely complications including scarring, bleeding, infection, swelling, pain, crusting, nerve damage, large wound,  incomplete excision, wound dehiscence,  nerve damage, recurrence, and a second procedure may be recommended to obtain the best cosmetic or functional result.    Informed consent was obtained and the patient underwent the procedure as follows:  The patient was placed supine on the operating table.  The cancer was identified, outlined with a marker, and verified by the patient.  The entire surgical field was prepped with Hibiclens.  The surgical site was anesthetized using Lidocaine 1% with epi 1:100,000.    The area of clinically apparent tumor was not debulked. The layer of tissue was then surgically excised using a #15 blade and was then transferred onto a specimen sheet maintaining the orientation of the specimen. Hemostasis was obtained using heat cautery. The wound site was then covered with a dressing while the tissue samples were processed  for examination.    The excised tissue was transported to the Mohs histology laboratory maintaining the tissue orientation.  The tissue specimen was relaxed so that the entire surgical margin was in a a single horizontal plane for sectioning and inked for precise mapping.  A precise reference map was drawn to reflect the sectioning of the specimen, colored inking of the margins, and orientation on the patient.  The tissue was processed using horizontal sectioning of the base and continuous peripheral margins.  The histopathologic sections were reviewed in conjunction with the reference map.    Total blocks: 1    Total slides:  2    Residual tumor was identified and indicated in red on the reference map, identifying the location where further tissue excision was necessary. Therefore, an additional stage of Mohs Micrographic surgery was deemed necessary.     Stage II   The patient was returned to the operating room, and the area prepped in the usual manner. The residual tumor was excised using the reference map as a guide. The specimen was transfered to a labeled specimen sheet maintaining the orientation of the specimen. Hemostasis was obtained and the wound site was covered with a dressing while the tissue was processed for examination.     The excised tissue was transported to the Mohs histology laboratory maintaining orientation. The specimen margins were inked for precise mapping and a reference map was prepared for the is additional stage to maintain precise orientation as described above. The tissue was processed using horizontal sectioning of the base and continuous peripheral margins. The histopathologic sections were reviewed in conjunction with the reference map.     Total blocks: 1    Total slides:  2    There were no cancer cells visualized on examination, therefore Mohs surgery was complete.       Reconstruction: Complex Closure    The patient was taken to the operative suite and placed supine on the  operating room table.  The defect was identified.  Appropriate markings were made with a marking pen to plan the repair.  The area was infiltrated with Lidocaine 1% with epi 1:100,000 and prepped with Hibiclens and draped with sterile towels.     The wound was debeveled and undermined widely.  Cones were excised within relaxed skin tension lines on both sides of the defect.  Hemostasis was obtained using electrocoagulation. The wound was narrowed with SMAS placation and the dermis and subcutaneous tissue were then approximated using 4-0 Monocryl buried vertical mattress sutures.  The wound edges were then approximated additional buried sutures were placed in a similar fashion where needed.  Percutaneous 5-0 fast absorbing gut sutures were carefully placed for maximum eversion and meticulous approximation.    Repair Size: 5.2cm  Anesthetic Amount Used: 5.5     The wound was cleansed with saline and ointment was applied along the wound surface.     A sterile pressure dressing was applied.  Wound care instructions were given verbally and in writing.  The patient left the operating suite in stable condition.  Patient was informed that additional refinement of the resulting surgical scar may be used as a second stage of this reconstruction.     The Attending surgeon was present for key portions of the procedure and always immediately available.    Staff Involved:    Scribe Disclosure  I, Ignacio Ryan, am serving as a scribe to document services personally performed by Dr. Andres Turner, based on data collection and the provider's statements to me.     Attending attestation:  I personally performed the entire procedure.  I have reviewed the note and edited it as necessary, and agree with its contents.    Andres Turner M.D.    Director of Dermatologic Surgery  Department of Dermatology  [facility]    Dermatology Surgery Clinic  [facility] and Surgery  Center  [address] [place] [id number]                                    Picture placed in chart for future reference.       [facility] Mohs Dermatologic Surgery and Shave Biopsy  Procedure Note    Dermatology Surgery Clinic  [facility]  Clinics and Surgery Center  [address] [place] [address]    Date of Service:  Apr 8, 2019  Surgery: Mohs micrographic surgery    Surgeon: Yue  Assistant Surgeon: Anabell    Case 2  Repair Type: complex  Repair Size: 3.0 cm  Suture Material: Fast Absorbing Gut 5-0  Tumor Type: BCC - Basal cell carcinoma  Location: Left Cheek  Derm-Path Accession #: [id number]  PreOp Size: 0.8 x 1.1 cm  PostOp Size: 1.4 x 1.5 cm  Mohs Accession #:  IM  Level of Defect: fat    Procedure:  We discussed the principles of treatment and most likely complications including scarring, bleeding, infection, swelling, pain, crusting, nerve damage, large wound,  incomplete excision, wound dehiscence,  nerve damage, recurrence, and a second procedure may be recommended to obtain the best cosmetic or functional result.    Informed consent was obtained and the patient underwent the procedure as follows:  The patient was placed supine on the operating table.  The cancer was identified, outlined with a marker, and verified by the patient.  The entire surgical field was prepped with Hibiclens.  The surgical site was anesthetized using Lidocaine 1% with epi 1:100,000.    The area of clinically apparent tumor was not debulked. The layer of tissue was then surgically excised using a #15 blade and was then transferred onto a specimen sheet maintaining the orientation of the specimen. Hemostasis was obtained using heat cautery. The wound site was then covered with a dressing while the tissue samples were processed for examination.    The excised tissue was transported to the Mohs histology laboratory maintaining the tissue orientation.  The tissue specimen  was relaxed so that the entire surgical margin was in a a single horizontal plane for sectioning and inked for precise mapping.  A precise reference map was drawn to reflect the sectioning of the specimen, colored inking of the margins, and orientation on the patient.  The tissue was processed using horizontal sectioning of the base and continuous peripheral margins.  The histopathologic sections were reviewed in conjunction with the reference map.    Total blocks: 1    Total slides:  2    There were no cancer cells visualized on examination, therefore Mohs surgery was complete.      Reconstruction: Complex Closure    The patient was taken to the operative suite and placed supine on the operating room table.  The defect was identified.  Appropriate markings were made with a marking pen to plan the repair.  The area was infiltrated with Lidocaine 1% with epi 1:100,000 and prepped with Hibiclens and draped with sterile towels.     The wound was debeveled and undermined widely.  Cones were excised within relaxed skin tension lines on both sides of the defect.  Hemostasis was obtained using electrocoagulation. The wound was narrowed with SMAS placation and the dermis and subcutaneous tissue were then approximated using 4-0 Monocryl buried vertical mattress sutures.  The wound edges were then approximated additional buried sutures were placed in a similar fashion where needed.  Percutaneous 5-0 fast absorbing gut sutures were carefully placed for maximum eversion and meticulous approximation.    Repair Size: 3.0 cm  Anesthetic Amount Used: 4.0 mL    The wound was cleansed with saline and ointment was applied along the wound surface.     A sterile pressure dressing was applied.  Wound care instructions were given verbally and in writing.  The patient left the operating suite in stable condition.  Patient was informed that additional refinement of the resulting surgical scar may be used as a second stage of this  reconstruction.     The Attending surgeon was present for key portions of the procedure and always immediately available.    Shave Biopsy Procedure note: After discussion of benefits and risks including but not limited to bleeding, infection, scar, incomplete removal, recurrence, and non-diagnostic biopsy, written consent and photographs were obtained. The area was cleaned with isopropyl alcohol. 0.5mL of 1% lidocaine with epinephrine was injected to obtain adequate anesthesia of the lesion on the L preauricular region. A shave biopsy was performed. Hemostasis was achieved with aluminium chloride. Vaseline and a sterile dressing were applied. The patient tolerated the procedure and no complications were noted. The patient was provided with verbal and written post care instructions. DDx r/o BCC      Staff Involved:    Scribe Disclosure  I, Ignacio Ryan, am serving as a scribe to document services personally performed by Dr. Andres Turner, based on data collection and the provider's statements to me.     Procedure Note: Biopsy by shave technique  The risks and benefits of the procedure were described to the patient. These include but are not limited to bleeding, infection, scar, incomplete removal, and non-diagnostic biopsy. Written informed consent was obtained. The L preauricular cheek (HAK vs. BCC) was cleansed with an alcohol pad and injected with 1% lidocaine with epinephrine buffered with sodium bicarbonate. Once anesthesia was obtained, a biopsy was performed with Gilette blade. The tissue was placed in a labeled container with formalin and sent to pathology. Hemostasis was achieved with aluminum chloride . Vaseline and a bandage were applied to the wound. The patient tolerated the procedure well and was given post biopsy care instructions.      Attending attestation:  I was present for key elements of the procedure and immediately available for all other portions of the procedure.  I have reviewed the  note and edited it as necessary.  Dr. Heath performed non key elements of the closure under my supervision.    Andres Turnre M.D.    Director of Dermatologic Surgery  Department of Dermatology  [facility]    Dermatology Surgery Clinic  [place] [phone]

## 2019-04-08 NOTE — PROGRESS NOTES
[facility] Mohs Dermatologic Surgery and Shave Biopsy  Procedure Note    Dermatology Surgery Clinic  [facility]  Clinics and Surgery Center  [address] [place] [id number]    Date of Service:  Apr 8, 2019  Surgery: Mohs micrographic surgery    Surgeon: Yue  Assistant Surgeon: Anabell    Case 2  Repair Type: complex  Repair Size: 3.0 cm  Suture Material: Fast Absorbing Gut 5-0  Tumor Type: BCC - Basal cell carcinoma  Location: Left Cheek  Derm-Path Accession #: [id number]  PreOp Size: 0.8 x 1.1 cm  PostOp Size: 1.4 x 1.5 cm  Mohs Accession #:  IM  Level of Defect: fat    Procedure:  We discussed the principles of treatment and most likely complications including scarring, bleeding, infection, swelling, pain, crusting, nerve damage, large wound,  incomplete excision, wound dehiscence,  nerve damage, recurrence, and a second procedure may be recommended to obtain the best cosmetic or functional result.    Informed consent was obtained and the patient underwent the procedure as follows:  The patient was placed supine on the operating table.  The cancer was identified, outlined with a marker, and verified by the patient.  The entire surgical field was prepped with Hibiclens.  The surgical site was anesthetized using Lidocaine 1% with epi 1:100,000.    The area of clinically apparent tumor was not debulked. The layer of tissue was then surgically excised using a #15 blade and was then transferred onto a specimen sheet maintaining the orientation of the specimen. Hemostasis was obtained using heat cautery. The wound site was then covered with a dressing while the tissue samples were processed for examination.    The excised tissue was transported to the Mohs histology laboratory maintaining the tissue orientation.  The tissue specimen was relaxed so that the entire surgical margin was in a a single horizontal plane for sectioning and inked for precise mapping.  A  precise reference map was drawn to reflect the sectioning of the specimen, colored inking of the margins, and orientation on the patient.  The tissue was processed using horizontal sectioning of the base and continuous peripheral margins.  The histopathologic sections were reviewed in conjunction with the reference map.    Total blocks: 1    Total slides:  2    There were no cancer cells visualized on examination, therefore Mohs surgery was complete.      Reconstruction: Complex Closure    The patient was taken to the operative suite and placed supine on the operating room table.  The defect was identified.  Appropriate markings were made with a marking pen to plan the repair.  The area was infiltrated with Lidocaine 1% with epi 1:100,000 and prepped with Hibiclens and draped with sterile towels.     The wound was debeveled and undermined widely.  Cones were excised within relaxed skin tension lines on both sides of the defect.  Hemostasis was obtained using electrocoagulation. The wound was narrowed with SMAS placation and the dermis and subcutaneous tissue were then approximated using 4-0 Monocryl buried vertical mattress sutures.  The wound edges were then approximated additional buried sutures were placed in a similar fashion where needed.  Percutaneous 5-0 fast absorbing gut sutures were carefully placed for maximum eversion and meticulous approximation.    Repair Size: 3.0 cm  Anesthetic Amount Used: 4.0 mL    The wound was cleansed with saline and ointment was applied along the wound surface.     A sterile pressure dressing was applied.  Wound care instructions were given verbally and in writing.  The patient left the operating suite in stable condition.  Patient was informed that additional refinement of the resulting surgical scar may be used as a second stage of this reconstruction.     The Attending surgeon was present for key portions of the procedure and always immediately available.    Shave Biopsy  Procedure note: After discussion of benefits and risks including but not limited to bleeding, infection, scar, incomplete removal, recurrence, and non-diagnostic biopsy, written consent and photographs were obtained. The area was cleaned with isopropyl alcohol. 0.5mL of 1% lidocaine with epinephrine was injected to obtain adequate anesthesia of the lesion on the L preauricular region. A shave biopsy was performed. Hemostasis was achieved with aluminium chloride. Vaseline and a sterile dressing were applied. The patient tolerated the procedure and no complications were noted. The patient was provided with verbal and written post care instructions. DDx r/o BCC      Staff Involved:    Scribe Disclosure  I, Ignacio Ryan, am serving as a scribe to document services personally performed by Dr. Andres Turner, based on data collection and the provider's statements to me.     Procedure Note: Biopsy by shave technique  The risks and benefits of the procedure were described to the patient. These include but are not limited to bleeding, infection, scar, incomplete removal, and non-diagnostic biopsy. Written informed consent was obtained. The L preauricular cheek (HAK vs. BCC) was cleansed with an alcohol pad and injected with 1% lidocaine with epinephrine buffered with sodium bicarbonate. Once anesthesia was obtained, a biopsy was performed with Gilette blade. The tissue was placed in a labeled container with formalin and sent to pathology. Hemostasis was achieved with aluminum chloride . Vaseline and a bandage were applied to the wound. The patient tolerated the procedure well and was given post biopsy care instructions.      Attending attestation:  I was present for key elements of the procedure and immediately available for all other portions of the procedure.  I have reviewed the note and edited it as necessary.  Dr. Heath performed non key elements of the closure under my supervision.  Andres Turner M.D.  Associate  Professor  Director of Dermatologic Surgery  Department of Dermatology  [facility]    Dermatology Surgery Clinic  [facility] and Surgery Center  [address] [place] [phone]

## 2019-04-09 ENCOUNTER — TELEPHONE (OUTPATIENT)
Dept: DERMATOLOGY | Facility: CLINIC | Age: 69
End: 2019-04-09

## 2019-04-09 NOTE — TELEPHONE ENCOUNTER
I called the patient and I left a message asking that he call back, we would like to know how he is doing after his procedure yesterday.   Enma Morataya, [facility]

## 2019-04-12 LAB — COPATH REPORT: NORMAL

## 2019-04-16 NOTE — TELEPHONE ENCOUNTER
I called the patient and I went over wound care with him again, we discussed the scar line being raised for the time being and that it would flatten out with time. The patient verbalized that he understood.   Enma Morataya CMA

## 2019-07-19 DIAGNOSIS — Z94.4 LIVER REPLACED BY TRANSPLANT (H): ICD-10-CM

## 2019-07-19 LAB
ALBUMIN SERPL-MCNC: 3.9 G/DL (ref 3.4–5)
ALP SERPL-CCNC: 68 U/L (ref 40–150)
ALT SERPL W P-5'-P-CCNC: 19 U/L (ref 0–70)
ANION GAP SERPL CALCULATED.3IONS-SCNC: 7 MMOL/L (ref 3–14)
AST SERPL W P-5'-P-CCNC: 11 U/L (ref 0–45)
BILIRUB DIRECT SERPL-MCNC: 0.2 MG/DL (ref 0–0.2)
BILIRUB SERPL-MCNC: 0.7 MG/DL (ref 0.2–1.3)
BUN SERPL-MCNC: 20 MG/DL (ref 7–30)
CALCIUM SERPL-MCNC: 8.6 MG/DL (ref 8.5–10.1)
CHLORIDE SERPL-SCNC: 107 MMOL/L (ref 94–109)
CO2 SERPL-SCNC: 24 MMOL/L (ref 20–32)
CREAT SERPL-MCNC: 0.93 MG/DL (ref 0.66–1.25)
ERYTHROCYTE [DISTWIDTH] IN BLOOD BY AUTOMATED COUNT: 13.2 % (ref 10–15)
GFR SERPL CREATININE-BSD FRML MDRD: 84 ML/MIN/{1.73_M2}
GLUCOSE SERPL-MCNC: 90 MG/DL (ref 70–99)
HCT VFR BLD AUTO: 50.9 % (ref 40–53)
HGB BLD-MCNC: 17.4 G/DL (ref 13.3–17.7)
MCH RBC QN AUTO: 33.2 PG (ref 26.5–33)
MCHC RBC AUTO-ENTMCNC: 34.2 G/DL (ref 31.5–36.5)
MCV RBC AUTO: 97 FL (ref 78–100)
PLATELET # BLD AUTO: 164 10E9/L (ref 150–450)
POTASSIUM SERPL-SCNC: 3.8 MMOL/L (ref 3.4–5.3)
PROT SERPL-MCNC: 7.5 G/DL (ref 6.8–8.8)
RBC # BLD AUTO: 5.24 10E12/L (ref 4.4–5.9)
SODIUM SERPL-SCNC: 138 MMOL/L (ref 133–144)
TACROLIMUS BLD-MCNC: 7.4 UG/L (ref 5–15)
TME LAST DOSE: NORMAL H
WBC # BLD AUTO: 8.5 10E9/L (ref 4–11)

## 2019-07-19 PROCEDURE — 80197 ASSAY OF TACROLIMUS: CPT | Performed by: INTERNAL MEDICINE

## 2019-07-23 DIAGNOSIS — Z94.4 LIVER REPLACED BY TRANSPLANT (H): ICD-10-CM

## 2019-07-23 NOTE — TELEPHONE ENCOUNTER
Please call pt about tacrolimus level done, 7.4.   Level is higher than expected.   Decrease tacrolimus dose to 1mg in the pm, continue taking 2mg in the am.

## 2019-07-24 NOTE — TELEPHONE ENCOUNTER
Left detailed message for pt to reduce tacro 0.5mg every 12 hours and call to con firm dose change.

## 2019-07-24 NOTE — TELEPHONE ENCOUNTER
Please decrease tacrolimus dose to 0.5mg Q 12hours.   Ask if he could recheck labs in 4-6 weeks, not wait for 3 months.

## 2019-07-25 RX ORDER — TACROLIMUS 0.5 MG/1
0.5 CAPSULE ORAL EVERY 12 HOURS
Qty: 180 CAPSULE | Refills: 3 | Status: SHIPPED | OUTPATIENT
Start: 2019-07-25 | End: 2020-10-05

## 2019-08-12 ENCOUNTER — CARE COORDINATION (OUTPATIENT)
Dept: TRANSPLANT | Facility: CLINIC | Age: 69
End: 2019-08-12

## 2019-08-12 ENCOUNTER — TELEPHONE (OUTPATIENT)
Dept: TRANSPLANT | Facility: CLINIC | Age: 69
End: 2019-08-12

## 2019-08-12 DIAGNOSIS — M19.90 OSTEOARTHRITIS: ICD-10-CM

## 2019-08-12 DIAGNOSIS — C67.9 BLADDER CANCER (H): ICD-10-CM

## 2019-08-12 DIAGNOSIS — Z94.4 LIVER REPLACED BY TRANSPLANT (H): Primary | ICD-10-CM

## 2019-08-12 DIAGNOSIS — M89.9 DISORDER OF BONE: ICD-10-CM

## 2019-08-12 NOTE — TELEPHONE ENCOUNTER
Message sent to scheduling pool to schedule annual DEXA scan per Dr. Simmons. Pt will be seen by Dr. Simmons 10/2/2019 at 1030 for annual follow up.

## 2019-08-12 NOTE — TELEPHONE ENCOUNTER
tried calling mobile number- number does not belong to Lup[facility]. Please verify when the pt calls back.    VM was left at the pt's home number to call the [facility] office to schedule the pt for an added on imaging test (Dexa Scan) before his appt wJessica Simmons.

## 2019-08-14 NOTE — TELEPHONE ENCOUNTER
tired to call the pts mobile number, this is incorrect number and does not belong to the pt.    VM was left at home number to call the [facility] office to schedule his dexa scan.

## 2019-08-30 ENCOUNTER — TRANSFERRED RECORDS (OUTPATIENT)
Dept: HEALTH INFORMATION MANAGEMENT | Facility: CLINIC | Age: 69
End: 2019-08-30

## 2019-09-18 DIAGNOSIS — Z13.220 LIPID SCREENING: ICD-10-CM

## 2019-09-18 DIAGNOSIS — Z94.4 LIVER REPLACED BY TRANSPLANT (H): ICD-10-CM

## 2019-09-20 DIAGNOSIS — Z13.220 LIPID SCREENING: ICD-10-CM

## 2019-09-20 DIAGNOSIS — Z94.4 LIVER REPLACED BY TRANSPLANT (H): ICD-10-CM

## 2019-09-20 LAB
ALBUMIN SERPL-MCNC: 4 G/DL (ref 3.4–5)
ALP SERPL-CCNC: 62 U/L (ref 40–150)
ALT SERPL W P-5'-P-CCNC: 19 U/L (ref 0–70)
ANION GAP SERPL CALCULATED.3IONS-SCNC: 7 MMOL/L (ref 3–14)
AST SERPL W P-5'-P-CCNC: 12 U/L (ref 0–45)
BILIRUB DIRECT SERPL-MCNC: 0.2 MG/DL (ref 0–0.2)
BILIRUB SERPL-MCNC: 1 MG/DL (ref 0.2–1.3)
BUN SERPL-MCNC: 18 MG/DL (ref 7–30)
CALCIUM SERPL-MCNC: 8.7 MG/DL (ref 8.5–10.1)
CHLORIDE SERPL-SCNC: 104 MMOL/L (ref 94–109)
CO2 SERPL-SCNC: 26 MMOL/L (ref 20–32)
CREAT SERPL-MCNC: 0.88 MG/DL (ref 0.66–1.25)
ERYTHROCYTE [DISTWIDTH] IN BLOOD BY AUTOMATED COUNT: 13.2 % (ref 10–15)
GFR SERPL CREATININE-BSD FRML MDRD: 88 ML/MIN/{1.73_M2}
GLUCOSE SERPL-MCNC: 85 MG/DL (ref 70–99)
HCT VFR BLD AUTO: 50.7 % (ref 40–53)
HGB BLD-MCNC: 17.3 G/DL (ref 13.3–17.7)
MCH RBC QN AUTO: 33.4 PG (ref 26.5–33)
MCHC RBC AUTO-ENTMCNC: 34.1 G/DL (ref 31.5–36.5)
MCV RBC AUTO: 98 FL (ref 78–100)
PLATELET # BLD AUTO: 152 10E9/L (ref 150–450)
POTASSIUM SERPL-SCNC: 3.6 MMOL/L (ref 3.4–5.3)
PROT SERPL-MCNC: 7.7 G/DL (ref 6.8–8.8)
RBC # BLD AUTO: 5.18 10E12/L (ref 4.4–5.9)
SODIUM SERPL-SCNC: 137 MMOL/L (ref 133–144)
TACROLIMUS BLD-MCNC: 3.1 UG/L (ref 5–15)
TME LAST DOSE: 2030 H
WBC # BLD AUTO: 8 10E9/L (ref 4–11)

## 2019-09-20 PROCEDURE — 80197 ASSAY OF TACROLIMUS: CPT | Performed by: INTERNAL MEDICINE

## 2019-09-30 ENCOUNTER — DOCUMENTATION ONLY (OUTPATIENT)
Dept: CARE COORDINATION | Facility: CLINIC | Age: 69
End: 2019-09-30

## 2019-10-02 ENCOUNTER — ANCILLARY PROCEDURE (OUTPATIENT)
Dept: BONE DENSITY | Facility: CLINIC | Age: 69
End: 2019-10-02
Attending: INTERNAL MEDICINE
Payer: MEDICARE

## 2019-10-02 ENCOUNTER — OFFICE VISIT (OUTPATIENT)
Dept: GASTROENTEROLOGY | Facility: CLINIC | Age: 69
End: 2019-10-02
Attending: INTERNAL MEDICINE
Payer: MEDICARE

## 2019-10-02 VITALS
WEIGHT: 170.5 LBS | TEMPERATURE: 97.7 F | HEIGHT: 71 IN | HEART RATE: 65 BPM | SYSTOLIC BLOOD PRESSURE: 155 MMHG | OXYGEN SATURATION: 95 % | DIASTOLIC BLOOD PRESSURE: 94 MMHG | BODY MASS INDEX: 23.87 KG/M2

## 2019-10-02 DIAGNOSIS — M89.9 DISORDER OF BONE: ICD-10-CM

## 2019-10-02 DIAGNOSIS — Z94.4 LIVER REPLACED BY TRANSPLANT (H): ICD-10-CM

## 2019-10-02 DIAGNOSIS — C67.9 BLADDER CANCER (H): ICD-10-CM

## 2019-10-02 DIAGNOSIS — M19.90 OSTEOARTHRITIS: ICD-10-CM

## 2019-10-02 DIAGNOSIS — Z94.4 LIVER REPLACED BY TRANSPLANT (H): Primary | ICD-10-CM

## 2019-10-02 PROCEDURE — G0463 HOSPITAL OUTPT CLINIC VISIT: HCPCS | Mod: ZF

## 2019-10-02 ASSESSMENT — MIFFLIN-ST. JEOR: SCORE: 1560.51

## 2019-10-02 ASSESSMENT — PAIN SCALES - GENERAL: PAINLEVEL: NO PAIN (0)

## 2019-10-02 NOTE — PROGRESS NOTES
[facility]    Hepatology follow-up    CHIEF COMPLAINT/REASON FOR THE VISIT:  Status post liver transplantation.      SUBJECTIVE:  Mr. Murguia is a 69-year-old white male whom we have seen here for decompensated liver disease.  He did have liver transplantation on 10/30/2010.  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  Anyway, he did improve  post-liver transplantation and his liver function tests remained stable.  His main issue was that he had degenerative joint disease.  He otherwise maintained his weight, does not have any abdominal pain, no nausea or vomiting.  He is having good bowel movements with no blood in it.  His appetite is good.  He has good stamina and he had his last colonoscopy in 2017 where he was found to have a serrated polyp.  He has also seen Dermatology and in fact he had basal cell cancer of the scalp removed.     Medical hx Surgical hx   Past Medical History:   Diagnosis Date     BCC (basal cell carcinoma)      Liver transplanted (H)      Unspecified cerebral artery occlusion with cerebral infarction October 2013      Past Surgical History:   Procedure Laterality Date     COLONOSCOPY N/A 1/12/2017    Procedure: COMBINED COLONOSCOPY, SINGLE OR MULTIPLE BIOPSY/POLYPECTOMY BY BIOPSY;  Surgeon: Le Simmons MD;  Location:  GI     MOHS MICROGRAPHIC PROCEDURE  3/7/13    right nasal ala, left upper back, right temple     TRANSPLANT            Medications  Prior to Admission medications    Medication Sig Start Date End Date Taking? Authorizing Provider   Albuterol Sulfate (VENTOLIN HFA) 108 (90 BASE) MCG/ACT AERS Inhale 2 puffs into the lungs every 6 hours.   Yes Reported, Patient   amLODIPine (NORVASC) 5 MG tablet Take 5 mg by mouth daily.   Yes Reported, Patient   ASPIRIN PO Take 325 mg by mouth daily   Yes Reported, Patient   fluticasone (FLOVENT HFA) 110 MCG/ACT inhaler Inhale 2 puffs into the lungs 2  "times daily   Yes Reported, Patient   metoprolol (LOPRESSOR) 25 MG tablet Take 25 mg by mouth daily    Yes Reported, Patient   tacrolimus (GENERIC EQUIVALENT) 0.5 MG capsule Take 1 capsule (0.5 mg) by mouth every 12 hours 7/25/19  Yes Le Simmons MD   ursodiol (ACTIGALL) 300 MG capsule TAKE ONE CAPSULE BY MOUTH TWICE A DAY 10/10/16  Yes Le Simmons MD       Allergies  Allergies   Allergen Reactions     Levaquin [Levofloxacin] Shortness Of Breath       Review of systems  A 10-point review of systems was negative    Examination  BP (!) 155/94 (BP Location: Left arm, Patient Position: Sitting, Cuff Size: Adult Regular)   Pulse 65   Temp 97.7  F (36.5  C) (Oral)   Ht 1.803 m (5' 11\")   Wt 77.3 kg (170 lb 8 oz)   SpO2 95%   BMI 23.78 kg/m    Body mass index is 23.78 kg/m .    Gen- well, NAD, A+Ox3, normal color  Lym- no palpable LAD  CVS- RRR  RS- CTA  Abd- Healed surgical scars. Not tender.  Extr- hands normal, no VANGIE  Skin- no rash or jaundice  Neuro- no asterixis  Psych- normal mood    Laboratory  Lab Results   Component Value Date     09/20/2019    POTASSIUM 3.6 09/20/2019    CHLORIDE 104 09/20/2019    CO2 26 09/20/2019    BUN 18 09/20/2019    CR 0.88 09/20/2019       Lab Results   Component Value Date    BILITOTAL 1.0 09/20/2019    ALT 19 09/20/2019    AST 12 09/20/2019    ALKPHOS 62 09/20/2019       Lab Results   Component Value Date    ALBUMIN 4.0 09/20/2019    PROTTOTAL 7.7 09/20/2019        Lab Results   Component Value Date    WBC 8.0 09/20/2019    HGB 17.3 09/20/2019    MCV 98 09/20/2019     09/20/2019       Lab Results   Component Value Date    INR 0.99 09/27/2012       Radiology    ASSESSMENT AND PLAN:  Status post liver transplantation.  Mr. Murguia had liver transplantation on 10/30/2010 for his decompensated cirrhosis of unknown etiology.  He did do well post liver transplantation, except that he had significant osteoarthritis and required bilateral " hip replacement and right knee replacement.  He is doing quite well regarding that at the present time.  Please note that the patient also had a large basal cell carcinoma of the scalp which was surgically removed.  He will meet with Dermatology again.  As far as the immunosuppression is concerned, we are not going to change.  He will follow also with his primary care physician for his health care maintenance including hypertension medications.  As far as colonoscopy is concerned, he is due for next year as he had already in 2017 a serrated adenoma removed from his colon.  This was a 25-minute visit, of which more than 50% was spent in explaining to the patient what our plan of care was.  We answered all his questions.      cc:   Joaquin Haines MD   [facility]    [address] [facility]    [address] [place] [address]  [phone]       Le Simmons MD  Hepatology  [facility]

## 2019-10-02 NOTE — LETTER
10/2/2019      RE: Lupillo Murguia  [address] [place] [address]       [facility]    Hepatology follow-up    CHIEF COMPLAINT/REASON FOR THE VISIT:  Status post liver transplantation.      SUBJECTIVE:  Mr. Murguia is a 69-year-old white male whom we have seen here for decompensated liver disease.  He did have liver transplantation on 10/30/2010.  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  Anyway, he did improve  post-liver transplantation and his liver function tests remained stable.  His main issue was that he had degenerative joint disease.  He otherwise maintained his weight, does not have any abdominal pain, no nausea or vomiting.  He is having good bowel movements with no blood in it.  His appetite is good.  He has good stamina and he had his last colonoscopy in 2017 where he was found to have a serrated polyp.  He has also seen Dermatology and in fact he had basal cell cancer of the scalp removed.     Medical hx Surgical hx   Past Medical History:   Diagnosis Date     BCC (basal cell carcinoma)      Liver transplanted (H)      Unspecified cerebral artery occlusion with cerebral infarction October 2013      Past Surgical History:   Procedure Laterality Date     COLONOSCOPY N/A 1/12/2017    Procedure: COMBINED COLONOSCOPY, SINGLE OR MULTIPLE BIOPSY/POLYPECTOMY BY BIOPSY;  Surgeon: Le Simmons MD;  Location:  GI     MOHS MICROGRAPHIC PROCEDURE  3/7/13    right nasal ala, left upper back, right temple     TRANSPLANT            Medications  Prior to Admission medications    Medication Sig Start Date End Date Taking? Authorizing Provider   Albuterol Sulfate (VENTOLIN HFA) 108 (90 BASE) MCG/ACT AERS Inhale 2 puffs into the lungs every 6 hours.   Yes Reported, Patient   amLODIPine (NORVASC) 5 MG tablet Take 5 mg by mouth daily.   Yes Reported, Patient   ASPIRIN PO Take 325 mg by mouth daily   Yes Reported, Patient  "  fluticasone (FLOVENT HFA) 110 MCG/ACT inhaler Inhale 2 puffs into the lungs 2 times daily   Yes Reported, Patient   metoprolol (LOPRESSOR) 25 MG tablet Take 25 mg by mouth daily    Yes Reported, Patient   tacrolimus (GENERIC EQUIVALENT) 0.5 MG capsule Take 1 capsule (0.5 mg) by mouth every 12 hours 7/25/19  Yes Le Simmons MD   ursodiol (ACTIGALL) 300 MG capsule TAKE ONE CAPSULE BY MOUTH TWICE A DAY 10/10/16  Yes Le Simmons MD       Allergies  Allergies   Allergen Reactions     Levaquin [Levofloxacin] Shortness Of Breath       Review of systems  A 10-point review of systems was negative    Examination  BP (!) 155/94 (BP Location: Left arm, Patient Position: Sitting, Cuff Size: Adult Regular)   Pulse 65   Temp 97.7  F (36.5  C) (Oral)   Ht 1.803 m (5' 11\")   Wt 77.3 kg (170 lb 8 oz)   SpO2 95%   BMI 23.78 kg/m     Body mass index is 23.78 kg/m .    Gen- well, NAD, A+Ox3, normal color  Lym- no palpable LAD  CVS- RRR  RS- CTA  Abd- Healed surgical scars. Not tender.  Extr- hands normal, no VANGIE  Skin- no rash or jaundice  Neuro- no asterixis  Psych- normal mood    Laboratory  Lab Results   Component Value Date     09/20/2019    POTASSIUM 3.6 09/20/2019    CHLORIDE 104 09/20/2019    CO2 26 09/20/2019    BUN 18 09/20/2019    CR 0.88 09/20/2019       Lab Results   Component Value Date    BILITOTAL 1.0 09/20/2019    ALT 19 09/20/2019    AST 12 09/20/2019    ALKPHOS 62 09/20/2019       Lab Results   Component Value Date    ALBUMIN 4.0 09/20/2019    PROTTOTAL 7.7 09/20/2019        Lab Results   Component Value Date    WBC 8.0 09/20/2019    HGB 17.3 09/20/2019    MCV 98 09/20/2019     09/20/2019       Lab Results   Component Value Date    INR 0.99 09/27/2012       Radiology    ASSESSMENT AND PLAN:  Status post liver transplantation.  Mr. Murguia had liver transplantation on 10/30/2010 for his decompensated cirrhosis of unknown etiology.  He did do well post liver " transplantation, except that he had significant osteoarthritis and required bilateral hip replacement and right knee replacement.  He is doing quite well regarding that at the present time.  Please note that the patient also had a large basal cell carcinoma of the scalp which was surgically removed.  He will meet with Dermatology again.  As far as the immunosuppression is concerned, we are not going to change.  He will follow also with his primary care physician for his health care maintenance including hypertension medications.  As far as colonoscopy is concerned, he is due for next year as he had already in 2017 a serrated adenoma removed from his colon.  This was a 25-minute visit, of which more than 50% was spent in explaining to the patient what our plan of care was.  We answered all his questions.      cc:   Joaquin Haines MD   [facility]    [address] [facility]    [address] [place] [address]  [phone]       Le Simmons MD  Hepatology  [facility]

## 2019-10-02 NOTE — NURSING NOTE
"Chief Complaint   Patient presents with     RECHECK     Annual follow up     Blood pressure (!) 155/94, pulse 65, temperature 97.7  F (36.5  C), temperature source Oral, height 1.803 m (5' 11\"), weight 77.3 kg (170 lb 8 oz), SpO2 95 %.    Lashae Crane on 10/2/2019 at 9:35 AM    "

## 2019-10-28 ENCOUNTER — TRANSFERRED RECORDS (OUTPATIENT)
Dept: HEALTH INFORMATION MANAGEMENT | Facility: CLINIC | Age: 69
End: 2019-10-28

## 2019-11-12 ENCOUNTER — TRANSFERRED RECORDS (OUTPATIENT)
Dept: HEALTH INFORMATION MANAGEMENT | Facility: CLINIC | Age: 69
End: 2019-11-12

## 2019-11-12 ENCOUNTER — TELEPHONE (OUTPATIENT)
Dept: TRANSPLANT | Facility: CLINIC | Age: 69
End: 2019-11-12

## 2019-11-12 NOTE — TELEPHONE ENCOUNTER
Returned Dr. Haines's call. Lupillo was seen by Dr. Haines (PCP) today and his respiratory symptoms are worsening. His stamina and shortness of breath has been present for some time but is worsening. O2 sats today were 88% at rest and 79% after activity.    Dr. Haines is going to place a referral for a Pulmonary consult as well as PFT's and Imaging. He will include Dr. Simmons on all communication.    Lupillo prefers to have his consult and work up done here at [facility].

## 2019-11-12 NOTE — TELEPHONE ENCOUNTER
Provider Call: General  Route to LPN    Reason for call: Provider would like a call back regarding some thing going with the pt,     Call back needed? Yes    Return Call Needed  Same as documented in contacts section  When to return call?: Greater than one day: Route standard priority

## 2019-11-27 ENCOUNTER — MEDICAL CORRESPONDENCE (OUTPATIENT)
Dept: HEALTH INFORMATION MANAGEMENT | Facility: CLINIC | Age: 69
End: 2019-11-27

## 2019-12-04 DIAGNOSIS — J44.9 COPD (CHRONIC OBSTRUCTIVE PULMONARY DISEASE) (H): Primary | ICD-10-CM

## 2019-12-04 NOTE — TELEPHONE ENCOUNTER
RECORDS RECEIVED FROM: in process/Internal    DATE RECEIVED: 1.31.20   NOTES STATUS DETAILS   OFFICE NOTE from referring provider Care Everywhere 12.4.19 Dr. Haines per appt notes     OFFICE NOTE from other specialist N/A    DISCHARGE SUMMARY from hospital N/A    DISCHARGE REPORT from the ER N/A    MEDICATION LIST Internal    IMAGING  (NEED IMAGES AND REPORTS)     CT SCAN N/A    CHEST XRAY (CXR) In process  1.31.20- scheduled    TESTS     PULMONARY FUNCTION TESTING (PFT) In process 1.31.20 scheduled        Action 12.4.19 sv   Action Taken Called and spoke with pt about records from [facility], pt said he will go to [facility] and sign a JUSTIN, pt states, its been over 2-3 years since the last time hes CXR and PFT, will send a request for records         Action 12.16.19 sv   Action Taken Pt called back on direct number, pt said the JUSTIN was signed at [facility], records request sent out to [facility]      Action 1.21.20 sv    Action Taken Received records from [facility]

## 2020-01-06 ENCOUNTER — OFFICE VISIT (OUTPATIENT)
Dept: DERMATOLOGY | Facility: CLINIC | Age: 70
End: 2020-01-06
Attending: INTERNAL MEDICINE
Payer: MEDICARE

## 2020-01-06 VITALS — SYSTOLIC BLOOD PRESSURE: 153 MMHG | HEART RATE: 78 BPM | DIASTOLIC BLOOD PRESSURE: 100 MMHG

## 2020-01-06 DIAGNOSIS — L82.0 INFLAMED SEBORRHEIC KERATOSIS: ICD-10-CM

## 2020-01-06 DIAGNOSIS — L57.0 ACTINIC KERATOSIS: ICD-10-CM

## 2020-01-06 DIAGNOSIS — L30.0 NUMMULAR DERMATITIS: ICD-10-CM

## 2020-01-06 DIAGNOSIS — D48.5 NEOPLASM OF UNCERTAIN BEHAVIOR OF SKIN: Primary | ICD-10-CM

## 2020-01-06 PROCEDURE — 88305 TISSUE EXAM BY PATHOLOGIST: CPT | Mod: TC | Performed by: DERMATOLOGY

## 2020-01-06 RX ORDER — TRIAMCINOLONE ACETONIDE 1 MG/G
OINTMENT TOPICAL 2 TIMES DAILY
Qty: 80 G | Refills: 3 | Status: SHIPPED | OUTPATIENT
Start: 2020-01-06

## 2020-01-06 ASSESSMENT — PAIN SCALES - GENERAL
PAINLEVEL: NO PAIN (0)
PAINLEVEL: MILD PAIN (2)

## 2020-01-06 NOTE — PROGRESS NOTES
[facility] Dermatology Note      Dermatology Problem List:  1. Liver transplant in 2010 on tacrolimus 1.5 mg bid  2. Hx of NMSC:  - R Vertex, BCC - treated with Mohs 10/03/17  - R Jaw line, BCC - s/p Mohs on 11/1/2018  - R nose, BCC - s/p Mohs on 11/1/2018  - L cheek, BCC - s/p Mohs on 4/8/2019  - L forehead BCC - s/p  Mohs 4/8/2019  - BCCs on scalp, nasal ala, back, right temple s/p removal  3. Lesion on the R anterior shoulder- ddx seborrheic keratosis vs. SCC vs. verrucous keratosis- s/p biopsy on 1/6/2020  4. Actinic keratoses   -s/p cryo on 1/6/2020  5. Inflamed seborrheic keratoses   -s/p cryo on 1/6/2020  6. Irritant dermatitis on the abdomen and R lower leg   -triamcinolone 0.1% ointment BID  7. Sebaceous hyperplasia and seborrheic keratoses     Encounter Date: Jan 6, 2020    CC:  Chief Complaint   Patient presents with     Skin Check     Lupillo is here today for a transplant skin check.          History of Present Illness:  Mr. Lupillo Murguia is a 69 year old male who presents for a transplant skin check. Of note, on arrival, the patient complains of dyspnea with exertion which has been ongoing for the past 2 weeks to the point that he has been using his rescue inhaler daily. He reports a history of COPD and asthma, and he states that he is seeing his pulmonologist in the near future to discuss his symptoms. He denies any chest pain or sweating. Regarding his skin, there is a warty lesion on his right anterior shoulder which he states has been present for about 10 years. Since it initially developed, he has not noticed much of any change. He has undergone cryotherapy to the lesion in the past, but this did not provide complete relief. There is also an irritating lesion near his right ear which he would like evaluated and treated if possible. The patient voices no other concerns.      Past Medical History:   Patient Active Problem List   Diagnosis     Liver replaced by transplant (H)     BCC  (basal cell carcinoma)     Bladder cancer (H)     Skin exam, screening for cancer     Past Medical History:   Diagnosis Date     BCC (basal cell carcinoma)      Liver transplanted (H)      Unspecified cerebral artery occlusion with cerebral infarction October 2013     Past Surgical History:   Procedure Laterality Date     COLONOSCOPY N/A 1/12/2017    Procedure: COMBINED COLONOSCOPY, SINGLE OR MULTIPLE BIOPSY/POLYPECTOMY BY BIOPSY;  Surgeon: Le Simmons MD;  Location:  GI     MOHS MICROGRAPHIC PROCEDURE  3/7/13    right nasal ala, left upper back, right temple     TRANSPLANT         Social History:  Patient reports that he quit smoking about 44 years ago. His smoking use included cigarettes. He smoked 0.00 packs per day. He has never used smokeless tobacco. He reports that he does not drink alcohol or use drugs.    Family History:  Family History   Problem Relation Age of Onset     Skin Cancer No family hx of      Melanoma No family hx of        Medications:  Current Outpatient Medications   Medication Sig Dispense Refill     Albuterol Sulfate (VENTOLIN HFA) 108 (90 BASE) MCG/ACT AERS Inhale 2 puffs into the lungs every 6 hours.       amLODIPine (NORVASC) 5 MG tablet Take 5 mg by mouth daily.       ASPIRIN PO Take 325 mg by mouth daily       fluticasone (FLOVENT HFA) 110 MCG/ACT inhaler Inhale 2 puffs into the lungs 2 times daily       metoprolol (LOPRESSOR) 25 MG tablet Take 25 mg by mouth daily        tacrolimus (GENERIC EQUIVALENT) 0.5 MG capsule Take 1 capsule (0.5 mg) by mouth every 12 hours 180 capsule 3     ursodiol (ACTIGALL) 300 MG capsule TAKE ONE CAPSULE BY MOUTH TWICE A DAY 60 capsule 11       Allergies   Allergen Reactions     Levaquin [Levofloxacin] Shortness Of Breath       Review of Systems:  -Constitutional: Otherwise feeling well today, in usual state of health.  -HEENT: Patient denies nonhealing oral sores.  -Skin: As above in HPI. No additional skin concerns.    Physical  exam:  Vitals: BP (!) 153/100   Pulse 78   GEN: This is a well developed, well-nourished male in no acute distress, in a pleasant mood.    SKIN: Full skin, which includes the head/face, both arms, chest, back, abdomen,both legs, genitalia and/or groin buttocks, digits and/or nails, was examined.  -Gonzalez skin type: II  -Tan verrucous papule in the R postauricular sulcus  -Erythematous scaly patches on the R abdomen and R lower leg with overlying excoriations  - on the R anterior shoulder, there is a 2.4 x 2 cm hyperkeratotic pink/tan plaque  -There are erythematous macules with overyling adherent scale on the scalp x2, R temple x1.   -There are yellow oily papules with central umbilication located on the face.  There are waxy stuck on tan to brown papules on the trunk and extremities.  -No other lesions of concern on areas examined.       Impression/Plan:  1. Lesion on the R anterior shoulder. Ddx seborrheic keratosis vs. SCC vs. verrucous keratosis  - Shave biopsy:  After discussion of benefits and risks including but not limited to bleeding/bruising, pain/swelling, infection, scar, incomplete removal, nerve damage/numbness, recurrence, and non-diagnostic biopsy, written consent, verbal consent and photographs were obtained. Time-out was performed. The area was cleaned with isopropyl alcohol. 0.5ml of 1% lidocaine with 1:100,000 epinephrine was injected to obtain adequate anesthesia. A shave biopsy was performed. Hemostasis was achieved with aluminium chloride and electrodesiccation. Vaseline and a sterile dressing were applied. The patient tolerated the procedure and no complications were noted. The patient was provided with verbal and written post care instructions.    2. Actinic keratoses  - Cryotherapy procedure note: After verbal consent and discussion of risks and benefits including but no limited to dyspigmentation/scar, blister, and pain, 3 was(were) treated with 1-2mm freeze border for 2 cycles with  liquid nitrogen. Post cryotherapy instructions were provided.    3. Inflamed seborrheic keratosis in the R postauricular sulcus  - Cryotherapy procedure note: After verbal consent and discussion of risks and benefits including but no limited to dyspigmentation/scar, blister, and pain, 1 was(were) treated with 1-2mm freeze border for 2 cycles with liquid nitrogen. Post cryotherapy instructions were provided.    4. Irritant dermatitis on the R abdomen and R lower leg  -We discussed the natural etiology of the condition as well as the risks, benefits, and efficacy of treatment with a topical. The patient is agreeable to this plan   -start: triamcinolone 0.1% ointment BID    5. Seborrheic keratoses and sebaceous hyperplasia  - Discussed the natural etiology and provided reassurances about the benign nature of the lesions.     CC Le Simmons MD  [address] [place] [address] [address]  [place] [address] on close of this encounter.  Follow-up in 6 months, earlier for new or changing lesions.     Dr. Gaspar staffed the patient    Staff Involved:  Scribe/Resident (Edward)/Staff    Scribe Disclosure  I, Dominic Najjar, am serving as a scribe to document services personally performed by Dr. Ayan Gaspar MD, based on data collection and the provider's statements to me.    Staff Physician Comments:   I saw and evaluated the patient with the resident and I edited the assessment and plan as documented in the note. I was present for the entire minor procedure, the above major procedure, and examination.    Provider Disclosure:   The documentation recorded by the scribe accurately reflects the services I personally performed and the decisions made by me.    Ayan Gaspar MD   of Dermatology  Department of Dermatology  [facility] [employer] [facility]

## 2020-01-06 NOTE — NURSING NOTE
Dermatology Rooming Note    Lupillo Murguia's goals for this visit include:   Chief Complaint   Patient presents with     Skin Check     Lupillo is here today for a transplant skin check.      KAREL Zambrano

## 2020-01-06 NOTE — LETTER
1/6/2020       RE: Lupillo Murguia  [address]  [place] [address]     Dear Colleague,    Thank you for referring your patient, Lupillo Murguia, to the [facility] DERMATOLOGY at [facility]. Please see a copy of my visit note below.    [facility] Dermatology Note      Dermatology Problem List:  1. Liver transplant in 2010 on tacrolimus 1.5 mg bid  2. Hx of NMSC:  - R Vertex, BCC - treated with Mohs 10/03/17  - R Jaw line, BCC -  s/p Mohs on 11/1/2018  - R nose, BCC - s/p Mohs on 11/1/2018  - L cheek, BCC - s/p Mohs on 4/8/2019  - L forehead BCC - s/p  Mohs 4/8/2019  - BCCs on scalp, nasal ala, back, right temple s/p removal  3. Lesion on the R anterior shoulder- ddx seborrheic keratosis vs. SCC vs. verrucous keratosis- s/p biopsy on 1/6/2020  4. Actinic keratoses   -s/p cryo on 1/6/2020  5. Inflamed seborrheic keratoses   -s/p cryo on 1/6/2020  6. Irritant dermatitis on the abdomen and R lower leg   -triamcinolone 0.1% ointment BID  7. Sebaceous hyperplasia and seborrheic keratoses     Encounter Date: Jan 6, 2020    CC:  Chief Complaint   Patient presents with     Skin Check     Lupillo is here today for a transplant skin check.          History of Present Illness:  Mr. Lupillo Murguia is a 69 year old male who presents for a transplant skin check. Of note, on arrival, the patient complains of dyspnea with exertion which has been ongoing for the past 2 weeks to the point that he has been using his rescue inhaler daily. He reports a history of COPD and asthma, and he states that he is seeing his pulmonologist in the near future to discuss his symptoms. He denies any chest pain or sweating. Regarding his skin, there is a warty lesion on his right anterior shoulder which he states has been present for about 10 years. Since it initially developed, he has not noticed much of any change. He has undergone cryotherapy to the lesion in the past, but this did not provide  complete relief. There is also an irritating lesion near his right ear which he would like evaluated and treated if possible. The patient voices no other concerns.      Past Medical History:   Patient Active Problem List   Diagnosis     Liver replaced by transplant (H)     BCC (basal cell carcinoma)     Bladder cancer (H)     Skin exam, screening for cancer     Past Medical History:   Diagnosis Date     BCC (basal cell carcinoma)      Liver transplanted (H)      Unspecified cerebral artery occlusion with cerebral infarction October 2013     Past Surgical History:   Procedure Laterality Date     COLONOSCOPY N/A 1/12/2017    Procedure: COMBINED COLONOSCOPY, SINGLE OR MULTIPLE BIOPSY/POLYPECTOMY BY BIOPSY;  Surgeon: Le Simmons MD;  Location:  GI     MOHS MICROGRAPHIC PROCEDURE  3/7/13    right nasal ala, left upper back, right temple     TRANSPLANT         Social History:  Patient reports that he quit smoking about 44 years ago. His smoking use included cigarettes. He smoked 0.00 packs per day. He has never used smokeless tobacco. He reports that he does not drink alcohol or use drugs.    Family History:  Family History   Problem Relation Age of Onset     Skin Cancer No family hx of      Melanoma No family hx of        Medications:  Current Outpatient Medications   Medication Sig Dispense Refill     Albuterol Sulfate (VENTOLIN HFA) 108 (90 BASE) MCG/ACT AERS Inhale 2 puffs into the lungs every 6 hours.       amLODIPine (NORVASC) 5 MG tablet Take 5 mg by mouth daily.       ASPIRIN PO Take 325 mg by mouth daily       fluticasone (FLOVENT HFA) 110 MCG/ACT inhaler Inhale 2 puffs into the lungs 2 times daily       metoprolol (LOPRESSOR) 25 MG tablet Take 25 mg by mouth daily        tacrolimus (GENERIC EQUIVALENT) 0.5 MG capsule Take 1 capsule (0.5 mg) by mouth every 12 hours 180 capsule 3     ursodiol (ACTIGALL) 300 MG capsule TAKE ONE CAPSULE BY MOUTH TWICE A DAY 60 capsule 11       Allergies    Allergen Reactions     Levaquin [Levofloxacin] Shortness Of Breath       Review of Systems:  -Constitutional: Otherwise feeling well today, in usual state of health.  -HEENT: Patient denies nonhealing oral sores.  -Skin: As above in HPI. No additional skin concerns.    Physical exam:  Vitals: BP (!) 153/100   Pulse 78   GEN: This is a well developed, well-nourished male in no acute distress, in a pleasant mood.    SKIN: Full skin, which includes the head/face, both arms, chest, back, abdomen,both legs, genitalia and/or groin buttocks, digits and/or nails, was examined.  -Gonzalez skin type: II  -Tan verrucous papule in the R postauricular sulcus  -Erythematous scaly patches on the R abdomen and R lower leg with overlying excoriations  - on the R anterior shoulder, there is a 2.4 x 2 cm hyperkeratotic pink/tan plaque  -There are erythematous macules with overyling adherent scale on the scalp x2, R temple x1.   -There are yellow oily papules with central umbilication located on the face.  There are waxy stuck on tan to brown papules on the trunk and extremities.  -No other lesions of concern on areas examined.       Impression/Plan:  1. Lesion on the R anterior shoulder. Ddx seborrheic keratosis vs. SCC vs. verrucous keratosis  - Shave biopsy:  After discussion of benefits and risks including but not limited to bleeding/bruising, pain/swelling, infection, scar, incomplete removal, nerve damage/numbness, recurrence, and non-diagnostic biopsy, written consent, verbal consent and photographs were obtained. Time-out was performed. The area was cleaned with isopropyl alcohol. 0.5ml of 1% lidocaine with 1:100,000 epinephrine was injected to obtain adequate anesthesia. A shave biopsy was performed. Hemostasis was achieved with aluminium chloride and electrodesiccation. Vaseline and a sterile dressing were applied. The patient tolerated the procedure and no complications were noted. The patient was provided with verbal  and written post care instructions.    2. Actinic keratoses  - Cryotherapy procedure note: After verbal consent and discussion of risks and benefits including but no limited to dyspigmentation/scar, blister, and pain, 3 was(were) treated with 1-2mm freeze border for 2 cycles with liquid nitrogen. Post cryotherapy instructions were provided.    3. Inflamed seborrheic keratosis in the R postauricular sulcus  - Cryotherapy procedure note: After verbal consent and discussion of risks and benefits including but no limited to dyspigmentation/scar, blister, and pain, 1 was(were) treated with 1-2mm freeze border for 2 cycles with liquid nitrogen. Post cryotherapy instructions were provided.    4. Irritant dermatitis on the R abdomen and R lower leg  -We discussed the natural etiology of the condition as well as the risks, benefits, and efficacy of treatment with a topical. The patient is agreeable to this plan   -start: triamcinolone 0.1% ointment BID    5. Seborrheic keratoses and sebaceous hyperplasia  - Discussed the natural etiology and provided reassurances about the benign nature of the lesions.     CC Le Simmons MD  [address] [place] [address] [address]  [place] [address] on close of this encounter.  Follow-up in 6 months, earlier for new or changing lesions.     Dr. Gaspar staffed the patient    Staff Involved:  Scribe/Resident (Edward)/Staff    Scribe Disclosure  I, Dominic Najjar, am serving as a scribe to document services personally performed by Dr. Ayan Gaspar MD, based on data collection and the provider's statements to me.    Staff Physician Comments:   I saw and evaluated the patient with the resident and I edited the assessment and plan as documented in the note. I was present for the entire minor procedure, the above major procedure, and examination.    Provider Disclosure:   The documentation recorded by the scribe accurately reflects the services I personally performed and the decisions made  by me.    Aayn Gaspar MD   of Dermatology  Department of Dermatology  [facility]

## 2020-01-06 NOTE — PATIENT INSTRUCTIONS

## 2020-01-08 ENCOUNTER — TRANSFERRED RECORDS (OUTPATIENT)
Dept: HEALTH INFORMATION MANAGEMENT | Facility: CLINIC | Age: 70
End: 2020-01-08

## 2020-01-08 LAB — COPATH REPORT: NORMAL

## 2020-01-31 ENCOUNTER — PRE VISIT (OUTPATIENT)
Dept: PULMONOLOGY | Facility: CLINIC | Age: 70
End: 2020-01-31

## 2020-01-31 ENCOUNTER — TELEPHONE (OUTPATIENT)
Dept: PULMONOLOGY | Facility: CLINIC | Age: 70
End: 2020-01-31

## 2020-01-31 ENCOUNTER — OFFICE VISIT (OUTPATIENT)
Dept: PULMONOLOGY | Facility: CLINIC | Age: 70
End: 2020-01-31
Attending: INTERNAL MEDICINE
Payer: MEDICARE

## 2020-01-31 ENCOUNTER — ANCILLARY PROCEDURE (OUTPATIENT)
Dept: GENERAL RADIOLOGY | Facility: CLINIC | Age: 70
End: 2020-01-31
Attending: INTERNAL MEDICINE
Payer: MEDICARE

## 2020-01-31 VITALS
HEART RATE: 74 BPM | SYSTOLIC BLOOD PRESSURE: 163 MMHG | BODY MASS INDEX: 23.8 KG/M2 | HEIGHT: 71 IN | RESPIRATION RATE: 18 BRPM | DIASTOLIC BLOOD PRESSURE: 87 MMHG | WEIGHT: 170 LBS | OXYGEN SATURATION: 92 %

## 2020-01-31 DIAGNOSIS — Z23 ENCOUNTER FOR IMMUNIZATION: ICD-10-CM

## 2020-01-31 DIAGNOSIS — J44.9 COPD (CHRONIC OBSTRUCTIVE PULMONARY DISEASE) (H): ICD-10-CM

## 2020-01-31 DIAGNOSIS — J44.9 CHRONIC OBSTRUCTIVE PULMONARY DISEASE, UNSPECIFIED COPD TYPE (H): Primary | ICD-10-CM

## 2020-01-31 PROCEDURE — G0009 ADMIN PNEUMOCOCCAL VACCINE: HCPCS | Mod: ZF

## 2020-01-31 PROCEDURE — 90670 PCV13 VACCINE IM: CPT | Mod: ZF | Performed by: INTERNAL MEDICINE

## 2020-01-31 PROCEDURE — G0463 HOSPITAL OUTPT CLINIC VISIT: HCPCS | Mod: ZF

## 2020-01-31 PROCEDURE — G0008 ADMIN INFLUENZA VIRUS VAC: HCPCS

## 2020-01-31 PROCEDURE — 25000128 H RX IP 250 OP 636: Mod: ZF | Performed by: INTERNAL MEDICINE

## 2020-01-31 RX ORDER — AZITHROMYCIN 250 MG/1
250 TABLET, FILM COATED ORAL DAILY
Qty: 6 TABLET | Refills: 1 | Status: SHIPPED | OUTPATIENT
Start: 2020-01-31 | End: 2020-02-05

## 2020-01-31 RX ORDER — BUDESONIDE AND FORMOTEROL FUMARATE DIHYDRATE 160; 4.5 UG/1; UG/1
2 AEROSOL RESPIRATORY (INHALATION) 2 TIMES DAILY
Qty: 1 INHALER | Refills: 11 | Status: SHIPPED | OUTPATIENT
Start: 2020-01-31

## 2020-01-31 RX ORDER — BUDESONIDE AND FORMOTEROL FUMARATE DIHYDRATE 160; 4.5 UG/1; UG/1
2 AEROSOL RESPIRATORY (INHALATION) 2 TIMES DAILY
COMMUNITY

## 2020-01-31 RX ORDER — PREDNISONE 20 MG/1
40 TABLET ORAL DAILY
Qty: 10 TABLET | Refills: 1 | Status: SHIPPED | OUTPATIENT
Start: 2020-01-31 | End: 2020-02-05

## 2020-01-31 RX ADMIN — PNEUMOCOCCAL 13-VALENT CONJUGATE VACCINE 0.5 ML: 2.2; 2.2; 2.2; 2.2; 2.2; 4.4; 2.2; 2.2; 2.2; 2.2; 2.2; 2.2; 2.2 INJECTION, SUSPENSION INTRAMUSCULAR at 15:05

## 2020-01-31 ASSESSMENT — MIFFLIN-ST. JEOR: SCORE: 1550.3

## 2020-01-31 ASSESSMENT — PAIN SCALES - GENERAL: PAINLEVEL: NO PAIN (0)

## 2020-01-31 NOTE — PROGRESS NOTES
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  He improved after the transplant and his main issue has been degenerative joint disease.  He has also had a basal cell cancer of the scalp removed.  His current immunosuppression is tacrolimus alone.    He has had breathing issues (asthma then diagnosed with COPD) for 40 years (since about age 30). He had just a rescue inhaler in the past, started on Flovent about 5 years ago, and then started on Symbicort about a week ago. 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 Once or twice a year he has an exacerbation and is placed on prednisone and an antibiotic. His symptoms then typically clear up for about 6 months. He did not have asthma as a child. He has been getting the prednisone and antibiotic twice per year for bout 10-12 years. He has had a lot of difficulty with osteoarthritis - both hips and right knee. He can walk a few blocks before he gets short of breath, though thinks it's better with Symbicort. He can do a flight of stairs without stopping.     He uses albuterol quite a bit less since starting the Symbicort about a week ago. He finished a 15 day course of prednisone about a week ago. He has had 2 treatments for exacerbations of COPD in the last 2 months and that is what led him to seek an appointment with pulmonary.  Prior to starting Symbicort he was using albuterol a few times a day. He produces some phlegm every 3-4 days, not much for sinus problems. Not much difficulty with cough.     He has had some seasonal allergies in the spring, allergic to dust and molds that he feels come from trees. Allergies have not been as bad the last 5 years. He was on Singulair in the past, hasn't needed it the last few years.     He quit smoking in 1976, he had smoked about 7 years, 1 ppd.     He  has had a flu shot this year. He has not had the pneumococcal vaccines.     A1AT normal per notes prior to transplant.     ROS:   Positive: having some visual loss over time, planning to see eye doctor.   Negative: ear problems, headaches, chest pain, n/v/d, new skin rash, hemoptysis. Remainder of 10 system ROS negative other than noted elsewhere.     Medications:   Current Outpatient Medications   Medication     budesonide-formoterol (SYMBICORT) 160-4.5 MCG/ACT Inhaler     Albuterol Sulfate (VENTOLIN HFA) 108 (90 BASE) MCG/ACT AERS     amLODIPine (NORVASC) 5 MG tablet     ASPIRIN PO     metoprolol (LOPRESSOR) 25 MG tablet     tacrolimus (GENERIC EQUIVALENT) 0.5 MG capsule     triamcinolone (KENALOG) 0.1 % external ointment     ursodiol (ACTIGALL) 300 MG capsule     No current facility-administered medications for this visit.          Past medical history:   Past Medical History:   Diagnosis Date     BCC (basal cell carcinoma)      Liver transplanted (H)      Unspecified cerebral artery occlusion with cerebral infarction October 2013       Past surgical History:   Past Surgical History:   Procedure Laterality Date     COLONOSCOPY N/A 1/12/2017    Procedure: COMBINED COLONOSCOPY, SINGLE OR MULTIPLE BIOPSY/POLYPECTOMY BY BIOPSY;  Surgeon: Le Simmons MD;  Location:  GI     MOHS MICROGRAPHIC PROCEDURE  3/7/13    right nasal ala, left upper back, right temple     TRANSPLANT           Social History: 7 pack year smoking history, quit 1970s. Quit alcohol in 1990. No marijuana or vaping. 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  "He lives in [place] with his wife. No pets.     Family History: brother had COPD, he smoked longer, approximately 25 years. His brother  a year ago of COPD.     Physical exam:  BP (!) 163/87 (BP Location: Right arm, Patient Position: Chair, Cuff Size: Adult Regular)   Pulse 74   Resp 18   Ht 1.791 m (5' 10.5\")   Wt 77.1 kg (170 lb)   SpO2 92%   BMI 24.05 kg/m    General: Well-appearing elderly man.  HEENT: Clear sclera, clear oropharynx  Neck: No lymphadenopathy  Cardiovascular: Regular rate and rhythm without murmurs, gallops, or rubs  Pulmonary: Decreased breath sounds throughout, a few scattered end expiratory wheezes.  No crackles.  Abdomen: Soft, nontender  Extremities: No edema  Neuro: No focal deficits, alert and answers questions appropriately  Psychiatric: Normal affect    Labs:   Bicarb normal in 2019  No recent eosinophils   No recent blood gases    Imaging:    Chest xray:  PA and lateral views of the chest. Lungs are hyperexpanded with  flattening of the hemidiaphragms. Cardiac silhouette is enlarged. No  focal opacity, pleural effusion, or pneumothorax. Visualized upper  abdomen is unremarkable.                                                                      Impression:   1. No focal airspace disease.  2. Cardiomegaly.    Pulmonary function testing:   Pulmonary function testing in 10/2010: FEV1 1.66 L, 45% predicted  FVC 3.95 L, 80% predicted  FEV1/FVC ratio 42%, reduced  Corrected diffusion capacity 13.34, 46% predicted  Severe obstruction with moderately reduced diffusion capacity          Severe obstruction with significant bronchodilator response. Hyperexpansion with air trapping. Normal diffusion capacity.     Assessment and plan:  69-year-old man with a past medical history of liver transplant for cryptogenic cirrhosis in  here for evaluation of asthma COPD overlap.  He has a fairly minimal smoking history, however it sounds like he had several occupational exposures that led " to asthma and now chronic obstructive pulmonary disease.  His FEV1 has continued to decline over the last 10 years, and on pulmonary function testing he has severe obstruction with a significant bronchodilator response consistent with a component of intrinsic asthma.  He has had frequent exacerbations over the last 10 to 12 years, receiving about 2 courses of prednisone and antibiotics per year.  He has had symptomatic improvement since starting on Symbicort and this is a good regimen for him.  If he remains symptomatic despite this then will recommend that he also start a long-acting muscarinic antagonist.  At this point there is no role for chronic azithromycin or Roflumilast.    Plan:  1. Prevnar, given immunosuppression and chronic lung disease he should receive both Prevnar and Pneumovax   2.  Prescription for Symbicort high dose was sent to his pharmacy, to ensure that he is on the right dose as I cannot find this in the medical record.  3.  Follow-up in 6 months.    The patient was seen and staffed with Dr. Terell Batres MD  Pulmonary/critical care fellow    Attending statement:    The patient was seen and examined by me with the resident Dr Batres.  The case was discussed at length.  Vitals, lab results and imaging from our visit were reviewed.  The note written by Dr Batres above reflects our joint assessment and plan.    Terell Chou MD

## 2020-01-31 NOTE — TELEPHONE ENCOUNTER
[facility] Call Center    Phone Message    May a detailed message be left on voicemail: yes    Reason for Call: Medication Question or concern regarding medication   Prescription Clarification  Name of Medication: azithromycin (ZITHROMAX) 250 MG tablet  Prescribing Provider: Dr. Batres   Pharmacy: [facility]   What on the order needs clarification? Direction, should it states take 2 tablets on first day?          Action Taken: Message routed to:  [facility] ([facility]): Pulm

## 2020-01-31 NOTE — PATIENT INSTRUCTIONS
Look at the dose on your inhaler when you get home and make sure it is the 160-4.5 dosage, otherwise you can call [facility] and ask them if the dosages match. You should be on the higher dose.     Pneumonia vaccine today.     Follow up in 6 months.     Use the prednisone and azithromycin if you have breathing difficulty. If you do not have improvement you need to be seen quickly.

## 2020-01-31 NOTE — LETTER
1/31/2020     RE: Lupillo Murguia  [address]  [place] [address]     Dear Colleague,    Thank you for referring your patient, Lupillo Murguia, to the [facility] at [facility]. Please see a copy of my visit note below.    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  He improved after the transplant and his main issue has been degenerative joint disease.  He has also had a basal cell cancer of the scalp removed.  His current immunosuppression is tacrolimus alone.    He has had breathing issues (asthma then diagnosed with COPD) for 40 years (since about age 30). He had just a rescue inhaler in the past, started on Flovent about 5 years ago, and then started on Symbicort about a week ago. 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 Once or twice a year he has an exacerbation and is placed on prednisone and an antibiotic. His symptoms then typically clear up for about 6 months. He did not have asthma as a child. He has been getting the prednisone and antibiotic twice per year for bout 10-12 years. He has had a lot of difficulty with osteoarthritis - both hips and right knee. He can walk a few blocks before he gets short of breath, though thinks it's better with Symbicort. He can do a flight of stairs without stopping.     He uses albuterol quite a bit less since starting the Symbicort about a week ago. He finished a 15 day course of prednisone about a week ago. He has had 2 treatments for exacerbations of COPD in the last 2 months and that is what led him to seek an appointment with pulmonary.  Prior to starting Symbicort he was using albuterol a few times a day. He produces some phlegm every 3-4 days, not much for sinus problems. Not much difficulty with cough.     He has  had some seasonal allergies in the spring, allergic to dust and molds that he feels come from trees. Allergies have not been as bad the last 5 years. He was on Singulair in the past, hasn't needed it the last few years.     He quit smoking in 1976, he had smoked about 7 years, 1 ppd.     He has had a flu shot this year. He has not had the pneumococcal vaccines.     A1AT normal per notes prior to transplant.     ROS:   Positive: having some visual loss over time, planning to see eye doctor.   Negative: ear problems, headaches, chest pain, n/v/d, new skin rash, hemoptysis. Remainder of 10 system ROS negative other than noted elsewhere.     Medications:   Current Outpatient Medications   Medication     budesonide-formoterol (SYMBICORT) 160-4.5 MCG/ACT Inhaler     Albuterol Sulfate (VENTOLIN HFA) 108 (90 BASE) MCG/ACT AERS     amLODIPine (NORVASC) 5 MG tablet     ASPIRIN PO     metoprolol (LOPRESSOR) 25 MG tablet     tacrolimus (GENERIC EQUIVALENT) 0.5 MG capsule     triamcinolone (KENALOG) 0.1 % external ointment     ursodiol (ACTIGALL) 300 MG capsule     No current facility-administered medications for this visit.          Past medical history:   Past Medical History:   Diagnosis Date     BCC (basal cell carcinoma)      Liver transplanted (H)      Unspecified cerebral artery occlusion with cerebral infarction October 2013       Past surgical History:   Past Surgical History:   Procedure Laterality Date     COLONOSCOPY N/A 1/12/2017    Procedure: COMBINED COLONOSCOPY, SINGLE OR MULTIPLE BIOPSY/POLYPECTOMY BY BIOPSY;  Surgeon: Le Simmons MD;  Location: [facility]     MOHS MICROGRAPHIC PROCEDURE  3/7/13    right nasal ala, left upper back, right temple     TRANSPLANT           Social History: 7 pack year smoking history, quit 1970s. Quit alcohol in 1990. No marijuana or vaping. 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 He lives in [place] with his wife. No pets.     Family History: brother had COPD, he smoked longer, approximately 25 years. His brother  a year ago of COPD.     Physical exam:  BP (!) 163/87 (BP Location: Right arm, Patient Position: Chair, Cuff Size: Adult Regular)   Pulse 74   Resp 18   Ht 1.791 m (5' 10.5\")   Wt 77.1 kg (170 lb)   SpO2 92%   BMI 24.05 kg/m     General: Well-appearing elderly man.  HEENT: Clear sclera, clear oropharynx  Neck: No lymphadenopathy  Cardiovascular: Regular rate and rhythm without murmurs, gallops, or rubs  Pulmonary: Decreased breath sounds throughout, a few scattered end expiratory wheezes.  No crackles.  Abdomen: Soft, nontender  Extremities: No edema  Neuro: No focal deficits, alert and answers questions appropriately  Psychiatric: Normal affect    Labs:   Bicarb normal in 2019  No recent eosinophils   No recent blood gases    Imaging:    Chest xray:  PA and lateral views of the chest. Lungs are hyperexpanded with  flattening of the hemidiaphragms. Cardiac silhouette is enlarged. No  focal opacity, pleural effusion, or pneumothorax. Visualized upper  abdomen is unremarkable.                                                                      Impression:   1. No focal airspace disease.  2. Cardiomegaly.    Pulmonary function testing:   Pulmonary function testing in 10/2010: FEV1 1.66 L, 45% predicted  FVC 3.95 L, 80% predicted  FEV1/FVC ratio 42%, reduced  Corrected diffusion capacity 13.34, 46% predicted  Severe obstruction with moderately reduced diffusion capacity        Severe obstruction with significant bronchodilator response. Hyperexpansion with air trapping. Normal " diffusion capacity.     Assessment and plan:  69-year-old man with a past medical history of liver transplant for cryptogenic cirrhosis in 2010 here for evaluation of asthma COPD overlap.  He has a fairly minimal smoking history, however it sounds like he had several occupational exposures that led to asthma and now chronic obstructive pulmonary disease.  His FEV1 has continued to decline over the last 10 years, and on pulmonary function testing he has severe obstruction with a significant bronchodilator response consistent with a component of intrinsic asthma.  He has had frequent exacerbations over the last 10 to 12 years, receiving about 2 courses of prednisone and antibiotics per year.  He has had symptomatic improvement since starting on Symbicort and this is a good regimen for him.  If he remains symptomatic despite this then will recommend that he also start a long-acting muscarinic antagonist.  At this point there is no role for chronic azithromycin or Roflumilast.    Plan:  1. Prevnar, given immunosuppression and chronic lung disease he should receive both Prevnar and Pneumovax   2.  Prescription for Symbicort high dose was sent to his pharmacy, to ensure that he is on the right dose as I cannot find this in the medical record.  3.  Follow-up in 6 months.    The patient was seen and staffed with Dr. Terell Batres MD  Pulmonary/critical care fellow    Attending statement:    The patient was seen and examined by me with the resident Dr Batres.  The case was discussed at length.  Vitals, lab results and imaging from our visit were reviewed.  The note written by Dr Batres above reflects our joint assessment and plan.  Terell Chou MD

## 2020-01-31 NOTE — NURSING NOTE
Given patient [id number] in right deltoid IM. Cleaned site with alcohol, and applied bandage. Pt tolerated injection well  Caro Dan CMA

## 2020-01-31 NOTE — NURSING NOTE
Chief Complaint   Patient presents with     Consult     COPD with repeat execerbation      Medications reviewed and updated.  Vitals taken  Caro Dan CMA

## 2020-02-03 LAB
DLCOUNC-%PRED-PRE: 80 %
DLCOUNC-PRE: 21.6 ML/MIN/MMHG
DLCOUNC-PRED: 26.9 ML/MIN/MMHG
ERV-%PRED-PRE: 91 %
ERV-PRE: 1.25 L
ERV-PRED: 1.37 L
EXPTIME-PRE: 11.63 SEC
FEF2575-%PRED-POST: 27 %
FEF2575-%PRED-PRE: 16 %
FEF2575-POST: 0.67 L/SEC
FEF2575-PRE: 0.4 L/SEC
FEF2575-PRED: 2.43 L/SEC
FEFMAX-%PRED-PRE: 49 %
FEFMAX-PRE: 4.31 L/SEC
FEFMAX-PRED: 8.68 L/SEC
FEV1-%PRED-PRE: 40 %
FEV1-PRE: 1.27 L
FEV1FEV6-PRE: 43 %
FEV1FEV6-PRED: 78 %
FEV1FVC-PRE: 34 %
FEV1FVC-PRED: 77 %
FEV1SVC-PRE: 25 %
FEV1SVC-PRED: 63 %
FIFMAX-PRE: 5.15 L/SEC
FRCPLETH-%PRED-PRE: 135 %
FRCPLETH-PRE: 5.1 L
FRCPLETH-PRED: 3.75 L
FVC-%PRED-PRE: 92 %
FVC-PRE: 3.76 L
FVC-PRED: 4.09 L
IC-%PRED-PRE: 104 %
IC-PRE: 3.76 L
IC-PRED: 3.59 L
RVPLETH-%PRED-PRE: 144 %
RVPLETH-PRE: 3.84 L
RVPLETH-PRED: 2.65 L
TLCPLETH-%PRED-PRE: 120 %
TLCPLETH-PRE: 8.86 L
TLCPLETH-PRED: 7.33 L
VA-%PRED-PRE: 93 %
VA-PRE: 6.19 L
VC-%PRED-PRE: 101 %
VC-PRE: 5.02 L
VC-PRED: 4.96 L

## 2020-08-28 ENCOUNTER — TELEPHONE (OUTPATIENT)
Dept: TRANSPLANT | Facility: CLINIC | Age: 70
End: 2020-08-28

## 2020-08-28 NOTE — TELEPHONE ENCOUNTER
Spoke to wife and asks that pt returns [facility]'s call for refills on tacro. Wife will inform pt.

## 2020-08-28 NOTE — TELEPHONE ENCOUNTER
----- Message from Roz Sherman RN sent at 8/28/2020 10:51 AM CDT -----    ----- Message -----  From: Tyler Mcdonald [facility]  Sent: 8/28/2020  10:14 AM CDT  To: Rosario Suárez RN    Lupillo last fill tacro on 3/5/20 for a 90 day supply but should have run out in June.   He has not returned reminder calls.    Tyler Mcdonald [facility]  Specialty Pharmacist [phone]

## 2020-09-24 DIAGNOSIS — Z94.4 LIVER REPLACED BY TRANSPLANT (H): Primary | ICD-10-CM

## 2020-10-02 ENCOUNTER — TELEPHONE (OUTPATIENT)
Dept: TRANSPLANT | Facility: CLINIC | Age: 70
End: 2020-10-02

## 2020-10-02 DIAGNOSIS — Z94.4 LIVER REPLACED BY TRANSPLANT (H): ICD-10-CM

## 2020-10-02 LAB
ALBUMIN SERPL-MCNC: 4 G/DL (ref 3.4–5)
ALBUMIN UR-MCNC: 100 MG/DL
ALP SERPL-CCNC: 61 U/L (ref 40–150)
ALT SERPL W P-5'-P-CCNC: 25 U/L (ref 0–70)
ANION GAP SERPL CALCULATED.3IONS-SCNC: 7 MMOL/L (ref 3–14)
APPEARANCE UR: ABNORMAL
AST SERPL W P-5'-P-CCNC: 22 U/L (ref 0–45)
BILIRUB DIRECT SERPL-MCNC: 0.3 MG/DL (ref 0–0.2)
BILIRUB SERPL-MCNC: 1.2 MG/DL (ref 0.2–1.3)
BILIRUB UR QL STRIP: NEGATIVE
BUN SERPL-MCNC: 16 MG/DL (ref 7–30)
CALCIUM SERPL-MCNC: 9 MG/DL (ref 8.5–10.1)
CHLORIDE SERPL-SCNC: 101 MMOL/L (ref 94–109)
CHOLEST SERPL-MCNC: 176 MG/DL
CO2 SERPL-SCNC: 28 MMOL/L (ref 20–32)
COLOR UR AUTO: YELLOW
CREAT SERPL-MCNC: 0.97 MG/DL (ref 0.66–1.25)
CREAT UR-MCNC: 212 MG/DL
ERYTHROCYTE [DISTWIDTH] IN BLOOD BY AUTOMATED COUNT: 13.4 % (ref 10–15)
GFR SERPL CREATININE-BSD FRML MDRD: 79 ML/MIN/{1.73_M2}
GLUCOSE SERPL-MCNC: 87 MG/DL (ref 70–99)
GLUCOSE UR STRIP-MCNC: NEGATIVE MG/DL
HCT VFR BLD AUTO: 50.9 % (ref 40–53)
HDLC SERPL-MCNC: 45 MG/DL
HGB BLD-MCNC: 17.6 G/DL (ref 13.3–17.7)
HGB UR QL STRIP: NEGATIVE
KETONES UR STRIP-MCNC: NEGATIVE MG/DL
LDLC SERPL CALC-MCNC: 111 MG/DL
LEUKOCYTE ESTERASE UR QL STRIP: NEGATIVE
MCH RBC QN AUTO: 33.8 PG (ref 26.5–33)
MCHC RBC AUTO-ENTMCNC: 34.6 G/DL (ref 31.5–36.5)
MCV RBC AUTO: 98 FL (ref 78–100)
MUCOUS THREADS #/AREA URNS LPF: PRESENT /LPF
NITRATE UR QL: NEGATIVE
NONHDLC SERPL-MCNC: 131 MG/DL
PH UR STRIP: 6 PH (ref 5–7)
PLATELET # BLD AUTO: 181 10E9/L (ref 150–450)
POTASSIUM SERPL-SCNC: 3.5 MMOL/L (ref 3.4–5.3)
PROT SERPL-MCNC: 7.8 G/DL (ref 6.8–8.8)
PROT UR-MCNC: 1.28 G/L
PROT/CREAT 24H UR: 0.6 G/G CR (ref 0–0.2)
RBC # BLD AUTO: 5.21 10E12/L (ref 4.4–5.9)
RBC #/AREA URNS AUTO: 7 /HPF (ref 0–2)
SODIUM SERPL-SCNC: 136 MMOL/L (ref 133–144)
SOURCE: ABNORMAL
SP GR UR STRIP: 1.02 (ref 1–1.03)
SQUAMOUS #/AREA URNS AUTO: <1 /HPF (ref 0–1)
TACROLIMUS BLD-MCNC: <3 UG/L (ref 5–15)
TME LAST DOSE: ABNORMAL H
TRIGL SERPL-MCNC: 100 MG/DL
UROBILINOGEN UR STRIP-MCNC: 0 MG/DL (ref 0–2)
WBC # BLD AUTO: 8.5 10E9/L (ref 4–11)
WBC #/AREA URNS AUTO: 1 /HPF (ref 0–5)

## 2020-10-02 PROCEDURE — 85027 COMPLETE CBC AUTOMATED: CPT | Performed by: PATHOLOGY

## 2020-10-02 PROCEDURE — 80076 HEPATIC FUNCTION PANEL: CPT | Performed by: PATHOLOGY

## 2020-10-02 PROCEDURE — 80048 BASIC METABOLIC PNL TOTAL CA: CPT | Performed by: PATHOLOGY

## 2020-10-02 PROCEDURE — 81001 URINALYSIS AUTO W/SCOPE: CPT | Performed by: PATHOLOGY

## 2020-10-02 PROCEDURE — 36415 COLL VENOUS BLD VENIPUNCTURE: CPT | Performed by: PATHOLOGY

## 2020-10-02 PROCEDURE — 84156 ASSAY OF PROTEIN URINE: CPT | Performed by: PATHOLOGY

## 2020-10-02 PROCEDURE — 80061 LIPID PANEL: CPT | Performed by: PATHOLOGY

## 2020-10-02 PROCEDURE — 80197 ASSAY OF TACROLIMUS: CPT | Performed by: PATHOLOGY

## 2020-10-05 DIAGNOSIS — Z94.4 LIVER REPLACED BY TRANSPLANT (H): ICD-10-CM

## 2020-10-05 DIAGNOSIS — Z94.4 LIVER REPLACED BY TRANSPLANT (H): Primary | ICD-10-CM

## 2020-10-05 RX ORDER — TACROLIMUS 0.5 MG/1
CAPSULE ORAL
Qty: 270 CAPSULE | Refills: 3 | Status: SHIPPED | OUTPATIENT
Start: 2020-10-05 | End: 2021-01-01

## 2020-10-05 NOTE — TELEPHONE ENCOUNTER
ISSUE:   Tacrolimus IR level <3 on 10/2/2020, goal 3-5, dose 0.5 mg BID.    PLAN:   Please call patient and confirm this was an accurate 12-hour trough. Verify Tacrolimus IR dose 0.5 mg BID. Confirm no new medications or illness. Confirm no missed doses. If accurate trough and accurate dose, increase Tacrolimus IR dose to 0.5 mg every AM and 1 mg every PM. Please recheck TAC level only in 1-2 weeks.      Rosario Suárez RN      OUTCOME:   Spoke with patient, they confirm accurate trough level and current dose 0.5 mg BID. Patient confirmed dose change to 0.5 mg am, 1mg pm and to repeat labs in 1-2  weeks. Orders sent to preferred pharmacy for dose change and lab for repeat labs. Patient voiced understanding of plan.     Mirna Olson LPN

## 2020-10-07 ENCOUNTER — TELEPHONE (OUTPATIENT)
Dept: TRANSPLANT | Facility: CLINIC | Age: 70
End: 2020-10-07

## 2020-10-07 DIAGNOSIS — Z94.4 LIVER REPLACED BY TRANSPLANT (H): Primary | ICD-10-CM

## 2020-10-07 DIAGNOSIS — R80.9 PROTEIN IN URINE: ICD-10-CM

## 2020-10-07 NOTE — TELEPHONE ENCOUNTER
Returned Marjan's call. Lupillo was admitted to [facility] Monday night for swelling in his elbow. Fluids was drained and they are awaiting results to determine infection versus gout.     Lupillo cancelled his appt with Dr. Simmons today due to admission.    I informed Marjan that Dr. Simmons would like Lupillo to be seen by nephrology to evaluate protein noted in his urine. We discussed the effect TAC on kidney function and that Lupillo's creatinine is stable and WNL but we would like nephrology to review labs to ensure we are doing what is best for Lupillo's kidney function.     Marjan verbalized understanding and will discuss with Lupillo. Nephrology referral placed and schedule requests sent for hepatology rescheduled visit and nephrology consult.

## 2020-10-07 NOTE — TELEPHONE ENCOUNTER
Pt's wife calling to cancel his appointment today  He is inpt at [facility] arm- Pain in arm- fluid drained from elbow

## 2020-11-03 DIAGNOSIS — Z94.4 LIVER REPLACED BY TRANSPLANT (H): ICD-10-CM

## 2020-11-03 LAB
TACROLIMUS BLD-MCNC: 3.3 UG/L (ref 5–15)
TME LAST DOSE: ABNORMAL H

## 2020-11-03 PROCEDURE — 36415 COLL VENOUS BLD VENIPUNCTURE: CPT | Performed by: PATHOLOGY

## 2020-11-03 PROCEDURE — 80197 ASSAY OF TACROLIMUS: CPT | Performed by: PATHOLOGY

## 2020-12-02 ENCOUNTER — VIRTUAL VISIT (OUTPATIENT)
Dept: GASTROENTEROLOGY | Facility: CLINIC | Age: 70
End: 2020-12-02
Attending: INTERNAL MEDICINE
Payer: MEDICARE

## 2020-12-02 DIAGNOSIS — Z12.83 SKIN EXAM, SCREENING FOR CANCER: ICD-10-CM

## 2020-12-02 DIAGNOSIS — Z94.4 LIVER REPLACED BY TRANSPLANT (H): Primary | ICD-10-CM

## 2020-12-02 PROCEDURE — 99442 PR PHYSICIAN TELEPHONE EVALUATION 11-20 MIN: CPT | Mod: 95 | Performed by: INTERNAL MEDICINE

## 2020-12-02 ASSESSMENT — PAIN SCALES - GENERAL: PAINLEVEL: NO PAIN (0)

## 2020-12-02 NOTE — LETTER
"    12/2/2020         RE: Lupillo Murguia  [address]  [place] [address]        Dear Colleague,    Thank you for referring your patient, Lupillo Murguia, to the [facility] [place]. Please see a copy of my visit note below.    Lupillo Murguia is a 70 year old male who is being evaluated via a billable telephone visit.      The patient has been notified of following:     \"This telephone visit will be conducted via a call between you and your physician/provider. We have found that certain health care needs can be provided without the need for a physical exam.  This service lets us provide the care you need with a short phone conversation.  If a prescription is necessary we can send it directly to your pharmacy.  If lab work is needed we can place an order for that and you can then stop by our lab to have the test done at a later time.    Telephone visits are billed at different rates depending on your insurance coverage. During this emergency period, for some insurers they may be billed the same as an in-person visit.  Please reach out to your insurance provider with any questions.    If during the course of the call the physician/provider feels a telephone visit is not appropriate, you will not be charged for this service.\"    Patient has given verbal consent for Telephone visit?  Yes    What phone number would you like to be contacted at? [phone]    How would you like to obtain your AVS? Mail a copy    Phone call duration: 18 minutes    Le Simmons MD    [facility]    Hepatology follow-up    CHIEF COMPLAINT AND REASON FOR VISIT:  Status post liver transplantation.      SUBJECTIVE:  Mr. Murguia is a 70-year-old male whom I have seen the first time in the ER when he came with decompensated cirrhosis of the liver.  We did a full workup and we listed him for liver transplantation.  Mr. Murguia got a liver transplantation on 10/30/2010.  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  Post-liver transplantation, he did very well.  He is still doing very well.  His main issue is he has degenerative joint disease and had bilateral hip replacement and knee replacement.  He was also recently seen for what appeared to septic arthritis when he presented with hand and elbow swelling and discomfort.  He at that time got admitted to the hospital and the initial thinking was this was infected.  He had then a joint aspiration and that has a negative culture and his aspirate was showing calcium pyrophosphatase, which was consistent with CPPD, which was the diagnosis.  Mr. Murguia also has issues with COPD.  He is currently on Symbicort and he is doing regarding that.  He has been up-to-date with Dermatology, and he will get another appointment for that.  He has osteoporosis on a recent DEXA scan.  We will get his vitamin D levels and see if he could be a candidate for bisphosphonates.  As far as his weight is concerned, it is around 170 and he is doing well regarding that.     Medical hx Surgical hx   Past Medical History:   Diagnosis Date     BCC (basal cell carcinoma)      Liver transplanted (H)      Unspecified cerebral artery occlusion with cerebral infarction October 2013      Past Surgical History:   Procedure Laterality Date     COLONOSCOPY N/A 1/12/2017    Procedure: COMBINED COLONOSCOPY, SINGLE OR MULTIPLE BIOPSY/POLYPECTOMY BY BIOPSY;  Surgeon: Le Simmons MD;  Location:  GI     MOHS MICROGRAPHIC PROCEDURE  3/7/13    right nasal ala, left upper back, right temple     TRANSPLANT            Medications  Prior to Admission medications    Medication Sig Start Date End Date Taking? Authorizing Provider   Albuterol Sulfate (VENTOLIN HFA) 108 (90 BASE) MCG/ACT AERS Inhale 2 puffs into the lungs every 6 hours.   Yes Reported, Patient   amLODIPine (NORVASC) 5 MG tablet Take  5 mg by mouth daily.   Yes Reported, Patient   ASPIRIN PO Take 325 mg by mouth daily   Yes Reported, Patient   B Complex Vitamins (B COMPLEX 100 PO) 1 tab(s)   Yes Reported, Patient   budesonide-formoterol (SYMBICORT) 160-4.5 MCG/ACT Inhaler Inhale 2 puffs into the lungs 2 times daily   Yes Reported, Patient   budesonide-formoterol (SYMBICORT) 160-4.5 MCG/ACT Inhaler Inhale 2 puffs into the lungs 2 times daily 1/31/20  Yes Terell Chou MD   metoprolol (LOPRESSOR) 25 MG tablet Take 25 mg by mouth daily    Yes Reported, Patient   tacrolimus (GENERIC EQUIVALENT) 0.5 MG capsule Take 1 capsule (0.5 mg) by mouth every morning AND 2 capsules (1 mg) every evening. 10/5/20  Yes Le Simmons MD   triamcinolone (KENALOG) 0.1 % external ointment Apply topically 2 times daily 1/6/20  Yes Ayan Gaspar MD   ursodiol (ACTIGALL) 300 MG capsule TAKE ONE CAPSULE BY MOUTH TWICE A DAY 10/10/16  Yes Le Simmons MD       Allergies  Allergies   Allergen Reactions     Levaquin [Levofloxacin] Shortness Of Breath       Review of systems  A 10-point review of systems was negative    Examination  There were no vitals taken for this visit.  There is no height or weight on file to calculate BMI.    Gen- well, NAD, A+Ox3, normal color  Psych- normal mood    Laboratory  Lab Results   Component Value Date     10/02/2020    POTASSIUM 3.5 10/02/2020    CHLORIDE 101 10/02/2020    CO2 28 10/02/2020    BUN 16 10/02/2020    CR 0.97 10/02/2020       Lab Results   Component Value Date    BILITOTAL 1.2 10/02/2020    ALT 25 10/02/2020    AST 22 10/02/2020    ALKPHOS 61 10/02/2020       Lab Results   Component Value Date    ALBUMIN 4.0 10/02/2020    PROTTOTAL 7.8 10/02/2020        Lab Results   Component Value Date    WBC 8.5 10/02/2020    HGB 17.6 10/02/2020    MCV 98 10/02/2020     10/02/2020       Lab Results   Component Value Date    INR 0.99 09/27/2012       Radiology    ASSESSMENT AND PLAN:   Status post liver transplantation.        Mr. Murguia received a liver transplantation on 10/30/2010 for decompensated cirrhosis.  He is doing quite well now, but he has the following issues which were addressed during this visit.      He has osteoarthritis which required bilateral hip replacement and right knee replacement.  His last DEXA scan was abnormal as it showed osteoporosis.  We will get his vitamin D level and he will need to go on calcium and vitamin D and possibly biphosphate.  We will decide that as appropriate.        He had a recent admission to an outside hospital for right elbow inflammation with swelling.  They thought that this could be septic arthritis and he was put on antibiotics, but eventually this was disproven by the aspirate as the aspirate contained calcium phosphatase consistent with CPPD.  He will follow with his primary care physician or can be seen here by our Rheumatology group.        He will need also to follow with his dermatologist locally.  He monitors his blood pressures at home.  We will see him here in a year.  Otherwise, he will do his labs as per protocol.     Le Simmons MD  Hepatology  [facility]

## 2020-12-02 NOTE — PROGRESS NOTES
"Lupillo Murguia is a 70 year old male who is being evaluated via a billable telephone visit.      The patient has been notified of following:     \"This telephone visit will be conducted via a call between you and your physician/provider. We have found that certain health care needs can be provided without the need for a physical exam.  This service lets us provide the care you need with a short phone conversation.  If a prescription is necessary we can send it directly to your pharmacy.  If lab work is needed we can place an order for that and you can then stop by our lab to have the test done at a later time.    Telephone visits are billed at different rates depending on your insurance coverage. During this emergency period, for some insurers they may be billed the same as an in-person visit.  Please reach out to your insurance provider with any questions.    If during the course of the call the physician/provider feels a telephone visit is not appropriate, you will not be charged for this service.\"    Patient has given verbal consent for Telephone visit?  Yes    What phone number would you like to be contacted at? [phone]    How would you like to obtain your AVS? Mail a copy    Phone call duration: 18 minutes    Le Simmons MD    [facility]    Hepatology follow-up    CHIEF COMPLAINT AND REASON FOR VISIT:  Status post liver transplantation.      SUBJECTIVE:  Mr. Murguia is a 70-year-old male whom I have seen the first time in the ER when he came with decompensated cirrhosis of the liver.  We did a full workup and we listed him for liver transplantation.  Mr. Murguia got a liver transplantation on 10/30/2010.  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  Post-liver transplantation, he did very well.  He is still doing very well.  His main issue is he has degenerative joint disease and had " bilateral hip replacement and knee replacement.  He was also recently seen for what appeared to septic arthritis when he presented with hand and elbow swelling and discomfort.  He at that time got admitted to the hospital and the initial thinking was this was infected.  He had then a joint aspiration and that has a negative culture and his aspirate was showing calcium pyrophosphatase, which was consistent with CPPD, which was the diagnosis.  Mr. Murguia also has issues with COPD.  He is currently on Symbicort and he is doing regarding that.  He has been up-to-date with Dermatology, and he will get another appointment for that.  He has osteoporosis on a recent DEXA scan.  We will get his vitamin D levels and see if he could be a candidate for bisphosphonates.  As far as his weight is concerned, it is around 170 and he is doing well regarding that.     Medical hx Surgical hx   Past Medical History:   Diagnosis Date     BCC (basal cell carcinoma)      Liver transplanted (H)      Unspecified cerebral artery occlusion with cerebral infarction October 2013      Past Surgical History:   Procedure Laterality Date     COLONOSCOPY N/A 1/12/2017    Procedure: COMBINED COLONOSCOPY, SINGLE OR MULTIPLE BIOPSY/POLYPECTOMY BY BIOPSY;  Surgeon: Le Simmons MD;  Location:  GI     MOHS MICROGRAPHIC PROCEDURE  3/7/13    right nasal ala, left upper back, right temple     TRANSPLANT            Medications  Prior to Admission medications    Medication Sig Start Date End Date Taking? Authorizing Provider   Albuterol Sulfate (VENTOLIN HFA) 108 (90 BASE) MCG/ACT AERS Inhale 2 puffs into the lungs every 6 hours.   Yes Reported, Patient   amLODIPine (NORVASC) 5 MG tablet Take 5 mg by mouth daily.   Yes Reported, Patient   ASPIRIN PO Take 325 mg by mouth daily   Yes Reported, Patient   B Complex Vitamins (B COMPLEX 100 PO) 1 tab(s)   Yes Reported, Patient   budesonide-formoterol (SYMBICORT) 160-4.5 MCG/ACT Inhaler Inhale 2  puffs into the lungs 2 times daily   Yes Reported, Patient   budesonide-formoterol (SYMBICORT) 160-4.5 MCG/ACT Inhaler Inhale 2 puffs into the lungs 2 times daily 1/31/20  Yes Terell Chou MD   metoprolol (LOPRESSOR) 25 MG tablet Take 25 mg by mouth daily    Yes Reported, Patient   tacrolimus (GENERIC EQUIVALENT) 0.5 MG capsule Take 1 capsule (0.5 mg) by mouth every morning AND 2 capsules (1 mg) every evening. 10/5/20  Yes Le Simmons MD   triamcinolone (KENALOG) 0.1 % external ointment Apply topically 2 times daily 1/6/20  Yes Ayan Gaspar MD   ursodiol (ACTIGALL) 300 MG capsule TAKE ONE CAPSULE BY MOUTH TWICE A DAY 10/10/16  Yes Le Simmons MD       Allergies  Allergies   Allergen Reactions     Levaquin [Levofloxacin] Shortness Of Breath       Review of systems  A 10-point review of systems was negative    Examination  There were no vitals taken for this visit.  There is no height or weight on file to calculate BMI.    Gen- well, NAD, A+Ox3, normal color  Psych- normal mood    Laboratory  Lab Results   Component Value Date     10/02/2020    POTASSIUM 3.5 10/02/2020    CHLORIDE 101 10/02/2020    CO2 28 10/02/2020    BUN 16 10/02/2020    CR 0.97 10/02/2020       Lab Results   Component Value Date    BILITOTAL 1.2 10/02/2020    ALT 25 10/02/2020    AST 22 10/02/2020    ALKPHOS 61 10/02/2020       Lab Results   Component Value Date    ALBUMIN 4.0 10/02/2020    PROTTOTAL 7.8 10/02/2020        Lab Results   Component Value Date    WBC 8.5 10/02/2020    HGB 17.6 10/02/2020    MCV 98 10/02/2020     10/02/2020       Lab Results   Component Value Date    INR 0.99 09/27/2012       Radiology    ASSESSMENT AND PLAN:  Status post liver transplantation.        Mr. Murguia received a liver transplantation on 10/30/2010 for decompensated cirrhosis.  He is doing quite well now, but he has the following issues which were addressed during this visit.      He has  osteoarthritis which required bilateral hip replacement and right knee replacement.  His last DEXA scan was abnormal as it showed osteoporosis.  We will get his vitamin D level and he will need to go on calcium and vitamin D and possibly biphosphate.  We will decide that as appropriate.        He had a recent admission to an outside hospital for right elbow inflammation with swelling.  They thought that this could be septic arthritis and he was put on antibiotics, but eventually this was disproven by the aspirate as the aspirate contained calcium phosphatase consistent with CPPD.  He will follow with his primary care physician or can be seen here by our Rheumatology group.        He will need also to follow with his dermatologist locally.  He monitors his blood pressures at home.  We will see him here in a year.  Otherwise, he will do his labs as per protocol.     Le Simmons MD  Hepatology  [facility]

## 2021-01-01 ENCOUNTER — TELEPHONE (OUTPATIENT)
Dept: TRANSPLANT | Facility: CLINIC | Age: 71
End: 2021-01-01
Payer: MEDICARE

## 2021-01-01 DIAGNOSIS — Z94.4 LIVER REPLACED BY TRANSPLANT (H): ICD-10-CM

## 2021-01-01 DIAGNOSIS — Z13.220 LIPID SCREENING: ICD-10-CM

## 2021-01-01 RX ORDER — TACROLIMUS 0.5 MG/1
CAPSULE ORAL
Qty: 270 CAPSULE | Refills: 3 | Status: SHIPPED | OUTPATIENT
Start: 2021-01-01

## 2021-03-05 ENCOUNTER — TRANSFERRED RECORDS (OUTPATIENT)
Dept: HEALTH INFORMATION MANAGEMENT | Facility: CLINIC | Age: 71
End: 2021-03-05

## 2021-03-07 ENCOUNTER — RECORDS - HEALTHEAST (OUTPATIENT)
Dept: LAB | Facility: CLINIC | Age: 71
End: 2021-03-07

## 2021-03-07 LAB
SARS-COV-2 PCR COMMENT: NORMAL
SARS-COV-2 RNA SPEC QL NAA+PROBE: NEGATIVE
SARS-COV-2 VIRUS SPECIMEN SOURCE: NORMAL

## 2021-03-19 DIAGNOSIS — Z94.4 LIVER REPLACED BY TRANSPLANT (H): ICD-10-CM

## 2021-03-19 LAB
ALBUMIN SERPL-MCNC: 3.5 G/DL (ref 3.4–5)
ALP SERPL-CCNC: 53 U/L (ref 40–150)
ALT SERPL W P-5'-P-CCNC: 22 U/L (ref 0–70)
ANION GAP SERPL CALCULATED.3IONS-SCNC: 4 MMOL/L (ref 3–14)
AST SERPL W P-5'-P-CCNC: 13 U/L (ref 0–45)
BILIRUB DIRECT SERPL-MCNC: 0.2 MG/DL (ref 0–0.2)
BILIRUB SERPL-MCNC: 0.7 MG/DL (ref 0.2–1.3)
BUN SERPL-MCNC: 12 MG/DL (ref 7–30)
CALCIUM SERPL-MCNC: 8.9 MG/DL (ref 8.5–10.1)
CHLORIDE SERPL-SCNC: 107 MMOL/L (ref 94–109)
CO2 SERPL-SCNC: 30 MMOL/L (ref 20–32)
CREAT SERPL-MCNC: 0.92 MG/DL (ref 0.66–1.25)
ERYTHROCYTE [DISTWIDTH] IN BLOOD BY AUTOMATED COUNT: 13.5 % (ref 10–15)
GFR SERPL CREATININE-BSD FRML MDRD: 84 ML/MIN/{1.73_M2}
GLUCOSE SERPL-MCNC: 86 MG/DL (ref 70–99)
HCT VFR BLD AUTO: 49.1 % (ref 40–53)
HGB BLD-MCNC: 16.9 G/DL (ref 13.3–17.7)
MCH RBC QN AUTO: 32.7 PG (ref 26.5–33)
MCHC RBC AUTO-ENTMCNC: 34.4 G/DL (ref 31.5–36.5)
MCV RBC AUTO: 95 FL (ref 78–100)
PLATELET # BLD AUTO: 164 10E9/L (ref 150–450)
POTASSIUM SERPL-SCNC: 3.5 MMOL/L (ref 3.4–5.3)
PROT SERPL-MCNC: 7.1 G/DL (ref 6.8–8.8)
RBC # BLD AUTO: 5.17 10E12/L (ref 4.4–5.9)
SODIUM SERPL-SCNC: 141 MMOL/L (ref 133–144)
TACROLIMUS BLD-MCNC: 3.8 UG/L (ref 5–15)
TME LAST DOSE: ABNORMAL H
WBC # BLD AUTO: 10.7 10E9/L (ref 4–11)

## 2021-03-19 PROCEDURE — 99000 SPECIMEN HANDLING OFFICE-LAB: CPT | Performed by: PATHOLOGY

## 2021-03-19 PROCEDURE — 85027 COMPLETE CBC AUTOMATED: CPT | Performed by: PATHOLOGY

## 2021-03-19 PROCEDURE — 80197 ASSAY OF TACROLIMUS: CPT | Mod: 90 | Performed by: PATHOLOGY

## 2021-03-19 PROCEDURE — 36415 COLL VENOUS BLD VENIPUNCTURE: CPT | Performed by: PATHOLOGY

## 2021-03-19 PROCEDURE — 80048 BASIC METABOLIC PNL TOTAL CA: CPT | Performed by: PATHOLOGY

## 2021-03-19 PROCEDURE — 80076 HEPATIC FUNCTION PANEL: CPT | Performed by: PATHOLOGY

## 2021-04-05 ENCOUNTER — TRANSFERRED RECORDS (OUTPATIENT)
Dept: HEALTH INFORMATION MANAGEMENT | Facility: CLINIC | Age: 71
End: 2021-04-05

## 2021-04-05 ENCOUNTER — RECORDS - HEALTHEAST (OUTPATIENT)
Dept: LAB | Facility: CLINIC | Age: 71
End: 2021-04-05

## 2021-04-05 LAB
ALBUMIN SERPL-MCNC: 4 G/DL (ref 3.5–5)
ANION GAP SERPL CALCULATED.3IONS-SCNC: 12 MMOL/L (ref 5–18)
BUN SERPL-MCNC: 28 MG/DL (ref 8–28)
CALCIUM SERPL-MCNC: 9.1 MG/DL (ref 8.5–10.5)
CHLORIDE BLD-SCNC: 104 MMOL/L (ref 98–107)
CO2 SERPL-SCNC: 25 MMOL/L (ref 22–31)
CREAT SERPL-MCNC: 1.08 MG/DL (ref 0.7–1.3)
GFR SERPL CREATININE-BSD FRML MDRD: >60 ML/MIN/1.73M2
GLUCOSE BLD-MCNC: 99 MG/DL (ref 70–125)
PHOSPHATE SERPL-MCNC: 2.9 MG/DL (ref 2.5–4.5)
POTASSIUM BLD-SCNC: 4.1 MMOL/L (ref 3.5–5)
SODIUM SERPL-SCNC: 141 MMOL/L (ref 136–145)
URATE SERPL-MCNC: 5.9 MG/DL (ref 3–8)

## 2021-05-26 ENCOUNTER — RECORDS - HEALTHEAST (OUTPATIENT)
Dept: ADMINISTRATIVE | Facility: CLINIC | Age: 71
End: 2021-05-26

## 2021-05-27 ENCOUNTER — TELEPHONE (OUTPATIENT)
Dept: TRANSPLANT | Facility: CLINIC | Age: 71
End: 2021-05-27

## 2021-05-27 NOTE — TELEPHONE ENCOUNTER
Received a message from pharmacy stating Lupillo has not picked up a RX for Tacrolimus since 10/2020. I spoke with Lupillo and he confirmed he is still taking as prescribed, has not missed any doses. He said he has a large supply because of dose adjustments in the past. He assures me he will call when he is in need of a refill. I did encourage Lupillo to check expiration dates and informed him that meds that are  may not be effective. Lupillo verbalized understanding.     Pharmacy notified.

## 2021-05-28 ENCOUNTER — RECORDS - HEALTHEAST (OUTPATIENT)
Dept: ADMINISTRATIVE | Facility: CLINIC | Age: 71
End: 2021-05-28

## 2021-05-29 ENCOUNTER — RECORDS - HEALTHEAST (OUTPATIENT)
Dept: ADMINISTRATIVE | Facility: CLINIC | Age: 71
End: 2021-05-29

## 2021-06-02 ENCOUNTER — RECORDS - HEALTHEAST (OUTPATIENT)
Dept: ADMINISTRATIVE | Facility: CLINIC | Age: 71
End: 2021-06-02

## 2021-08-03 ENCOUNTER — TRANSFERRED RECORDS (OUTPATIENT)
Dept: HEALTH INFORMATION MANAGEMENT | Facility: CLINIC | Age: 71
End: 2021-08-03
Payer: MEDICARE

## 2021-09-22 ENCOUNTER — TELEPHONE (OUTPATIENT)
Dept: TRANSPLANT | Facility: CLINIC | Age: 71
End: 2021-09-22

## 2021-09-22 NOTE — TELEPHONE ENCOUNTER
Spoke to pt to let him know that [facility] has been trying to reach out to mail a supply of tacro and no return call. Pt states he's been using his surplus supply but will reach out to the pharmacy.

## 2021-11-08 NOTE — TELEPHONE ENCOUNTER
Annual chart review completed and Lupillo is due for the following:    - annual hepatology visit   - annual dermatology visit   - colonoscopy  - annual lipid panel  - annual urinalysis and urine protein    Called Lupillo to discuss. He said he doesn't have enough money to pay for his clinic visits or his labs. He does pay for his immunosuppression meds and that's all he can afford. I talked with Lupillo about the importance of monitoring labs and following up with Dr. Simmons yearly. He said he is open to that if we can help with any resources that might be available to him to cover the cost. I told Lupillo I would send a message to Lovely Green, , to look at any options available to him. Lupillo is agreeable to talking with Lovely and after they talk, if there is help for him, I can request his hepatology and lab visits.

## 2021-11-09 NOTE — TELEPHONE ENCOUNTER
I called Lupillo to discuss financial options and insurance coverage. Per chart review, Lupillo has Medicare without a supplemental insurance, which would assist with lowering the cost of medical bills. Per chart, he previously qualified for Blue plus [place] and Medicaid. Lupillo did not recall this information.     I provided Lupillo the contact for [facility] for [facility] and  for his current bills.     I discussed with him that it appears that due to his Medicare coverage, he is responsible for 20% of the cost of his care. In order to get this cost covered he will need to  a supplemental insurance (Medigap) or see if he qualifies for [place] again. I gave him the senior linkage line phone number to discuss further. He indicated that he will call the senior linkage line to discuss insurance coverage. I offered to assist with calling the senior linkage line. He declined and will call on his own.

## 2022-01-01 ENCOUNTER — LAB REQUISITION (OUTPATIENT)
Dept: LAB | Facility: CLINIC | Age: 72
End: 2022-01-01
Payer: MEDICARE

## 2022-01-01 ENCOUNTER — TELEPHONE (OUTPATIENT)
Dept: TRANSPLANT | Facility: CLINIC | Age: 72
End: 2022-01-01
Payer: MEDICARE

## 2022-01-01 ENCOUNTER — TRANSFERRED RECORDS (OUTPATIENT)
Dept: HEALTH INFORMATION MANAGEMENT | Facility: CLINIC | Age: 72
End: 2022-01-01

## 2022-01-01 DIAGNOSIS — R05.9 COUGH, UNSPECIFIED: ICD-10-CM

## 2022-01-01 DIAGNOSIS — Z94.4 LIVER REPLACED BY TRANSPLANT (H): Primary | ICD-10-CM

## 2022-01-01 LAB — SARS-COV-2 RNA RESP QL NAA+PROBE: NEGATIVE

## 2022-01-01 PROCEDURE — U0003 INFECTIOUS AGENT DETECTION BY NUCLEIC ACID (DNA OR RNA); SEVERE ACUTE RESPIRATORY SYNDROME CORONAVIRUS 2 (SARS-COV-2) (CORONAVIRUS DISEASE [COVID-19]), AMPLIFIED PROBE TECHNIQUE, MAKING USE OF HIGH THROUGHPUT TECHNOLOGIES AS DESCRIBED BY CMS-2020-01-R: HCPCS | Mod: ORL | Performed by: STUDENT IN AN ORGANIZED HEALTH CARE EDUCATION/TRAINING PROGRAM

## 2022-03-10 NOTE — TELEPHONE ENCOUNTER
Lupillo is overdue for hepatology follow up. He was not able to afford a visit back in November when we spoke but he spoke with his , Lovely, who gave him recommendations on coverage. Attempted to reach Lupillo today to ask about his coverage status and if we can schedule a routine post transplant follow up. No answer and voicemail is full, Lupillo does not have Luristic access either. Will send a letter to ask Lupillo to call us for follow up.

## 2022-03-10 NOTE — LETTER
Lupillo Murguia  [address] [place] [address]                March 10, 2022    Oscar Wesley,     I tried calling you to discuss your coverage in regards to clinic visits and to assist in scheduling your routine follow up with hepatology. Your voicemail is full. Please call me to follow up at [phone], option 5.       Thank you,   Rosario Suárez, RN, BSN, CCTN  Post Liver Transplant Coordinator

## 2022-03-17 NOTE — TELEPHONE ENCOUNTER
Patient Call: General  Route to LPN    Reason for call: Patient requested to speak to coordinator after 3:00 pm    Call back needed? Yes  Return Call Needed  Same as documented in contacts section  When to return call?: Same day: Route High Priority

## 2022-03-18 NOTE — TELEPHONE ENCOUNTER
Returned Lupillo's call.     Lupillo is overdue for a visit with Dr. Simmons but insurance coverage and financial concerns have been a barrier. He has been diagnosed with Fallen Head Syndrome. He said he isn't doing well as financial constraints have added an extra burden to him.     He said he is home bound because he can no longer drive. Lupillo is agreeable to a video visit with Dr. Simmons, request placed today.

## 2022-11-02 ENCOUNTER — TELEPHONE (OUTPATIENT)
Dept: TRANSPLANT | Facility: CLINIC | Age: 72
End: 2022-11-02

## 2022-11-02 DIAGNOSIS — Z94.4 LIVER REPLACED BY TRANSPLANT (H): Primary | ICD-10-CM

## 2022-11-02 NOTE — TELEPHONE ENCOUNTER
Annual chart review completed and Lupillo is due for the following;    - colonoscopy.  - Hepatology visit with Dr. Simmons.  - Dermatology visit.  - Annual lipid panel, urinalysis, and urine protein.   - Routine transplant labs.    Letter sent to Lupillo to notify him of this. Referral placed for a visit with Dr. Simmons.

## 2022-11-02 NOTE — LETTER
Lupillo Murguia  [address] [place] [address]                November 2, 2022    Oscar Wesley,     I hope you are well. I completed your annual chart review and you are due for the following;    - Annual hepatology visit with Dr. Simmons. I have placed a request and our scheduling department will reach out with appointment details.   - Annual dermatology visit for a generalized skin check.   - Annual fasting lipid panel, urinalysis and urine protein.   - Routine transplant labs which should be drawn every 3 months.   - Colonoscopy or some type of colon cancer screening.     Please call me with any questions/concerns or if I can assist in any way.     Thank you,   Rosario Suárez, RN, BSN, CCTN  Post Liver Transplant Coordinator

## 2022-11-07 ENCOUNTER — POST MORTEM DOCUMENTATION (OUTPATIENT)
Dept: TRANSPLANT | Facility: CLINIC | Age: 72
End: 2022-11-07

## 2022-11-07 NOTE — PROGRESS NOTES
Received notification on 2022 at 0822 of patient's death from department of [facility] death registry.   Place of death was reported as not provided.  Graft status at the time of death was reported as Unknown.  Additional information: No additional information provided.   TIS verification is: Pending  The Transplant Office has been notified that patient is . The Post Mortem Encounter has been completed. Notifications have been sent to the Care team, Immunology lab, Transplant Financial  and Social Work.   Instructions have been sent to cancel pending appointments, discontinue pending orders, eliminate paper chart and send a sympathy card to the family.  HIMANSHU SPRAGUE  Received notification of patient's death from Rosario Suárez RN.  Place of death was reported as home.  Graft status at the time of death was reported as Functioning.  Additional information: Cause of Death reported as Natural causes.  TIS verification is: Complete

## (undated) RX ORDER — ALBUTEROL SULFATE 0.83 MG/ML
SOLUTION RESPIRATORY (INHALATION)
Status: DISPENSED
Start: 2020-01-31